# Patient Record
Sex: FEMALE | Race: AMERICAN INDIAN OR ALASKA NATIVE
[De-identification: names, ages, dates, MRNs, and addresses within clinical notes are randomized per-mention and may not be internally consistent; named-entity substitution may affect disease eponyms.]

---
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
## 2019-04-23 NOTE — PROGRESS NOTE
Assessment and Plan





- Patient Problems


(1) Hypertension


Onset Date: ~10/03/18   Current Visit: Yes   Status: Acute   


Qualifiers: 


   Hypertension type: essential hypertension   Qualified Code(s): I10 - 

Essential (primary) hypertension   


Plan to address problem: 


Pt in good spirits -120/80-70 Denies HA, blurred vision, chest pain. Will 

complete the 24hr urine @ 1230 @nd BMZ @ 1700 Continue POC 








Subjective





- Subjective


Date of service: 10/03/18 (c/o some ctx)


Principal diagnosis: IUP @ 36w4d; 24hr urine complete @ 1230


Patient reports: fetal movement normal, contractions





Objective





- Vital Signs


Vital Signs: 


 Vital Signs - 12hr











  10/03/18 10/03/18





  03:27 08:00


 


Temperature 97.9 F 98.9 F


 


Pulse Rate 79 


 


Respiratory 16 





Rate  


 


Blood Pressure 127/72 





[Left]  














- Exam


Breasts: deferred


Cardiovascular: Regular rate


Lungs: Normal air movement


Abdomen: Present: normal appearance, soft.  Absent: distention, tenderness


Uterus: Present: normal


FHR: auscultation normal, category 1


Uterine Contraction Monitor Mode: External


Cervical Dilatation: 1


Cervical Effacement Percentage: 50


Fetal station: -3


Uterine Contraction Pattern: Irregular


Uterine Tone Measurement Phase: Resting


Uterine Contraction Intensity: Mild


Extremities: normal


Deep Tendon Reflex Grade: Normal +2





- Labs


Labs: 


 Abnormal Labs











  10/02/18 10/02/18





  10:59 10:59


 


MCV  74 L 


 


MCH  23 L 


 


RDW  23.4 H 


 


Seg Neutrophils %  71.3 H 


 


Creatinine   0.5 L


 


Lactate Dehydrogenase   264 H








 Laboratory Results - last 24 hr











  10/02/18 10/02/18 10/02/18





  10:59 10:59 10:59


 


WBC  9.5  


 


RBC  4.49  


 


Hgb  10.4  


 


Hct  33.2  


 


MCV  74 L  


 


MCH  23 L  


 


MCHC  31  


 


RDW  23.4 H  


 


Plt Count  413  


 


Lymph % (Auto)  19.1  


 


Mono % (Auto)  6.3  


 


Eos % (Auto)  2.0  


 


Baso % (Auto)  1.3  


 


Lymph #  1.8  


 


Mono #  0.6  


 


Eos #  0.2  


 


Baso #  0.1  


 


Seg Neutrophils %  71.3 H  


 


Seg Neutrophils #  6.8  


 


Creatinine    0.5 L


 


Estimated GFR    > 60


 


Uric Acid    5.9


 


AST    20


 


ALT    16


 


Lactate Dehydrogenase    264 H


 


Urine Color   


 


Urine Turbidity   


 


Urine pH   


 


Ur Specific Gravity   


 


Urine Protein   


 


Urine Glucose (UA)   


 


Urine Ketones   


 


Urine Blood   


 


Urine Nitrite   


 


Urine Bilirubin   


 


Urine Urobilinogen   


 


Ur Leukocyte Esterase   


 


Urine WBC (Auto)   


 


Urine RBC (Auto)   


 


U Epithel Cells (Auto)   


 


Urine Bacteria (Auto)   


 


Urine Mucus   


 


Blood Type   A POSITIVE 


 


Antibody Screen   Negative 














  10/02/18





  12:20


 


WBC 


 


RBC 


 


Hgb 


 


Hct 


 


MCV 


 


MCH 


 


MCHC 


 


RDW 


 


Plt Count 


 


Lymph % (Auto) 


 


Mono % (Auto) 


 


Eos % (Auto) 


 


Baso % (Auto) 


 


Lymph # 


 


Mono # 


 


Eos # 


 


Baso # 


 


Seg Neutrophils % 


 


Seg Neutrophils # 


 


Creatinine 


 


Estimated GFR 


 


Uric Acid 


 


AST 


 


ALT 


 


Lactate Dehydrogenase 


 


Urine Color  Yellow


 


Urine Turbidity  Slightly-cloudy


 


Urine pH  5.0


 


Ur Specific Gravity  1.020


 


Urine Protein  <15 mg/dl


 


Urine Glucose (UA)  Neg


 


Urine Ketones  Neg


 


Urine Blood  Neg


 


Urine Nitrite  Neg


 


Urine Bilirubin  Neg


 


Urine Urobilinogen  < 2.0


 


Ur Leukocyte Esterase  Neg


 


Urine WBC (Auto)  < 1.0


 


Urine RBC (Auto)  1.0


 


U Epithel Cells (Auto)  3.0


 


Urine Bacteria (Auto)  1+


 


Urine Mucus  2+


 


Blood Type 


 


Antibody Screen
n/a

## 2019-11-15 ENCOUNTER — HOSPITAL ENCOUNTER (INPATIENT)
Dept: HOSPITAL 5 - TRG | Age: 19
LOS: 4 days | Discharge: HOME | End: 2019-11-19
Attending: OBSTETRICS & GYNECOLOGY | Admitting: OBSTETRICS & GYNECOLOGY
Payer: MEDICAID

## 2019-11-15 DIAGNOSIS — Z3A.38: ICD-10-CM

## 2019-11-15 DIAGNOSIS — Z23: ICD-10-CM

## 2019-11-15 DIAGNOSIS — F32.9: ICD-10-CM

## 2019-11-15 DIAGNOSIS — B00.89: ICD-10-CM

## 2019-11-15 LAB
ALT SERPL-CCNC: 21 UNITS/L (ref 7–56)
BILIRUB UR QL STRIP: (no result)
BLOOD UR QL VISUAL: (no result)
HCT VFR BLD CALC: 33 % (ref 30.3–42.9)
HGB BLD-MCNC: 10.4 GM/DL (ref 10.1–14.3)
MCHC RBC AUTO-ENTMCNC: 32 % (ref 30–34)
MCV RBC AUTO: 78 FL (ref 79–97)
MUCOUS THREADS #/AREA URNS HPF: (no result) /HPF
PH UR STRIP: 5 [PH] (ref 5–7)
PLATELET # BLD: 347 K/MM3 (ref 140–440)
RBC # BLD AUTO: 4.25 M/MM3 (ref 3.65–5.03)
RBC #/AREA URNS HPF: 3 /HPF (ref 0–6)
URATE SERPL-MCNC: 5.9 MG/DL (ref 3.5–7.6)
UROBILINOGEN UR-MCNC: < 2 MG/DL (ref ?–2)
WBC #/AREA URNS HPF: 1 /HPF (ref 0–6)

## 2019-11-15 PROCEDURE — 84460 ALANINE AMINO (ALT) (SGPT): CPT

## 2019-11-15 PROCEDURE — 86592 SYPHILIS TEST NON-TREP QUAL: CPT

## 2019-11-15 PROCEDURE — 86901 BLOOD TYPING SEROLOGIC RH(D): CPT

## 2019-11-15 PROCEDURE — 82565 ASSAY OF CREATININE: CPT

## 2019-11-15 PROCEDURE — 83735 ASSAY OF MAGNESIUM: CPT

## 2019-11-15 PROCEDURE — 36415 COLL VENOUS BLD VENIPUNCTURE: CPT

## 2019-11-15 PROCEDURE — 85025 COMPLETE CBC W/AUTO DIFF WBC: CPT

## 2019-11-15 PROCEDURE — 85027 COMPLETE CBC AUTOMATED: CPT

## 2019-11-15 PROCEDURE — 85014 HEMATOCRIT: CPT

## 2019-11-15 PROCEDURE — 86900 BLOOD TYPING SEROLOGIC ABO: CPT

## 2019-11-15 PROCEDURE — 83615 LACTATE (LD) (LDH) ENZYME: CPT

## 2019-11-15 PROCEDURE — 84450 TRANSFERASE (AST) (SGOT): CPT

## 2019-11-15 PROCEDURE — 87902 NFCT AGT GNTYP ALYS HEP C: CPT

## 2019-11-15 PROCEDURE — 86850 RBC ANTIBODY SCREEN: CPT

## 2019-11-15 PROCEDURE — 80307 DRUG TEST PRSMV CHEM ANLYZR: CPT

## 2019-11-15 PROCEDURE — 3E0P7VZ INTRODUCTION OF HORMONE INTO FEMALE REPRODUCTIVE, VIA NATURAL OR ARTIFICIAL OPENING: ICD-10-PCS | Performed by: OBSTETRICS & GYNECOLOGY

## 2019-11-15 PROCEDURE — 85018 HEMOGLOBIN: CPT

## 2019-11-15 PROCEDURE — 81001 URINALYSIS AUTO W/SCOPE: CPT

## 2019-11-15 PROCEDURE — 84550 ASSAY OF BLOOD/URIC ACID: CPT

## 2019-11-15 PROCEDURE — 71045 X-RAY EXAM CHEST 1 VIEW: CPT

## 2019-11-15 PROCEDURE — C1765 ADHESION BARRIER: HCPCS

## 2019-11-15 NOTE — PROGRESS NOTE
Assessment and Plan





- Patient Problems


(1) 38 weeks gestation of pregnancy


Current Visit: Yes   Status: Acute   





(2) Gestational hypertension


Current Visit: Yes   Status: Acute   


Qualifiers: 


   Trimester: third trimester   Qualified Code(s): O13.3 - Gestational 

[pregnancy-induced] hypertension without significant proteinuria, third 

trimester   





(3) HSV-2 (herpes simplex virus 2) infection


Current Visit: Yes   Status: Acute   





(4) BMI 40.0-44.9, adult


Current Visit: Yes   Status: Acute   





(5) History of depression


Current Visit: Yes   Status: Acute   





Subjective





- Subjective


Date of service: 11/15/19


Interval history: 





Patient was noted to have elevated BP's in office and was instrd to go to [facility] 

for evaluation, she presented several hours later complaining of HA with 

slightly elevated BP's compared to those noted earlier in the pregnancy. With 

her history of preeclampsia ~1year ago and now increased BP's and HA will admit 

for induction. 





Past Pregnancy History 


   :      2


 Delivery Date: 10/07/2018


  Gestational Age: 37 weeks


  Anesthesia:  epidural


  Delivery Type: Vaginal


  Birth Weight: 6.75 lbs


  Gender: male


  Location: [facility]


  Complications: pre-eclampsia/eclampsia w/Chtn





APGAR - 1 minute: 8


  5 minutes: 9








Past Medical History:


   Reviewed history from 2018 and no changes required:


      asthma - Dr. peterson 


      Asthma





Past Surgical History:


   Reviewed history from 2018 and no changes required:


      negative





Past Medical History 


Anesthesia Complications: negative


Anemia: negative


Autoimmune Disorder: negative


Bleeding Disorder: negative


Blood Transfusions: negative


Breast Disease: negative


Diabetes: negative


Heart Disease: negative


Hypertension: positive, HTN of pregnancy


Hepatitis/Liver Disease: negative


Kidney Disease/UTI: negative


Neurologic/Epilepsy/Migraines: negative


Phlebitis/Varicosities: negative


Psychiatric: negative


Pulmonary Disease/Asthma: negative


Thyroid Disease: negative


Hospitalizations: negative


Surgery (Non-gyn): negative


Abnormal PAP: negative


SPENCER Exposure: negative


Infertility: negative


Uterine Anomaly: negative


Uterine Surgery (not C/S): negative


Other Gynecologic Problems: negative





Social Hx: single


no ETOH/drugs/smoking





Infection History 


Hx of STD: none


HIV Risk Eval: low risk


Hepatitis B Risk Eval: low risk


Personal hx. of genital herpes: no


Partner hx. of genital herpes: no


Rash, Viral, or Febrile illness since last LMP? no


Varicella/Chicken Pox Status: Unknown


TB Risk: no


Infection History Comments: Has not had Chicken pox





Genetic History 


 Congenital Heart Defect:


    Mom: no  Dad: no


Canavan Disease:


    Mom: no  Dad: no


Thalassemia


    Mom: no  Dad: no


Neural Tube Defect


    Mom: no  Dad: no


Down's Syndrome


    Mom: no  Dad: no


William-Sachs


    Mom: no  Dad: no


Sickle Cell Disease/Trait


    Mom: no  Dad: no


Hemophilia


    Mom: no  Dad: no


Muscular Dystrophy


    Mom: no  Dad: no


Cystic Fibrosis


    Mom: no  Dad: no


Crawford Chorea


    Mom: no  Dad: no


Mental Retardation


    Mom: no  Dad: no


Fragile X


    Mom: no  Dad: no


Other Genetic/Chromosomal Disorder


    Mom: no  Dad: no


Child w/other birth defect


    Mom: no  Dad: no





Enviromental Exposures 


Xray Exposure: no


Medication, drug, or alcohol use since LMP: no


Chemical/Other Exposure: no


Exposure to Cat Liter: no


Hx of Parvovirus (Fifth Disease): no


Active Medications (reviewed today):


VALACYCLOVIR HCL 1 GM ORAL TABLET (VALACYCLOVIR HCL) 1 tab po qd


PNV () 


VENTOLIN () 


PROAIR () 


ZOLOFT 50 MG ORAL TABLET (SERTRALINE HCL) 1 po in the morning





Current Allergies (reviewed today):


No known allergies





Patient: YOLANDA SANTOS


ID: [id number]


Note: All result statuses are Final unless otherwise noted.





Tests: (1) RPR, Rfx Qn RPR/Confirm TP ([id number])


  RPR                       Non Reactive                Non Reactive     *1





Tests: (2) Panel 408497 ([id number])


 HIV Screen 4th Generation wRfx


                            Non Reactive                Non Reactive     *2


    


Performed At:  [facility], [address] [id number]


Peter Smith MD     Phone:  [phone]





Note: An exclamation gunjan (!) indicates a result that was not dispersed into the

flowsheet.


Document Creation Date: 2019 12:13 PM





Tests: (1) Ct, Ng, Trich vag by TUCKER ([id number])


  Order Note: Clinical Information: SRC:VR                     SRC:UR


! Chlamydia by TUCKER          Negative                    Negative         *1


! Gonococcus by TUCKER         Negative                    Negative         *2


! Trich vag by TUCKER          Negative                    Negative         *3





Tests: (2) Strep Gp B TUCKER ([id number])


! Strep Gp B TUCKER            Negative                    Negative         *4


    Centers for Disease Control and Prevention (CDC) and American Congress


of Obstetricians and Gynecologists (ACOG) guidelines for prevention of


 group B streptococcal (GBS) disease specify co-collection of


a vaginal and rectal swab specimen to maximize sensitivity of GBS


detection. Per the CDC and ACOG, swabbing both the lower vagina and


rectum substantially increases the yield of detection compared with


sampling the vagina alone.


Penicillin G, ampicillin, or cefazolin are indicated for intrapartum


prophylaxis of  GBS colonization. Reflex susceptibility


testing should be performed prior to use of clindamycin only on GBS


isolates from penicillin-allergic women who are considered a high risk


for anaphylaxis. Treatment with vancomycin without additional testing


is warranted if resistance to clindamycin is noted.


   


    


Performed At:  [facility], [facility] [address] [id number]


Peter Smith MD     Phone:  [phone]





Note: An exclamation gunjan (!) indicates a result that was not dispersed into the

flowsheet.


Document Creation Date: 2019 10:12 AM


_______________________________________________________________________





(1) Order result status: Final


Collection or observation date-time: 2019 04:19





Objective





- Vital Signs


Vital Signs: 


                               Vital Signs - 12hr











  11/15/19 11/15/19 11/15/19





  20:18 20:54 21:30


 


Pulse Rate 74 102 H 86


 


Blood Pressure 186/101 144/97 143/79














  11/15/19





  22:19


 


Pulse Rate 93 H


 


Blood Pressure 130/97














- Labs


Labs: 


                                  Abnormal Labs











  11/15/19





  21:52


 


WBC  11.5 H


 


MCV  78 L


 


MCH  25 L


 


RDW  19.8 H








                         Laboratory Results - last 24 hr











  11/15/19





  21:52


 


WBC  11.5 H


 


RBC  4.25


 


Hgb  10.4


 


Hct  33.0


 


MCV  78 L


 


MCH  25 L


 


MCHC  32


 


RDW  19.8 H


 


Plt Count  347

## 2019-11-15 NOTE — HISTORY AND PHYSICAL REPORT
History of Present Illness


Date of examination: 11/15/19


Chief complaint: 





Elevated blood pressures, HA and history of PreEclampsia


History of present illness: 





Patient was noted to have elevated BP's in office and was instrd to go to [facility] 

for evaluation, she presented several hours later complaining of HA with 

slightly elevated BP's compared to those noted earlier in the pregnancy. With 

her history of preeclampsia ~1year ago and now increased BP's and HA will admit 

for induction. 





Past Pregnancy History 


   :      2


 Delivery Date: 10/07/2018


  Gestational Age: 37 weeks


  Anesthesia:  epidural


  Delivery Type: Vaginal


  Birth Weight: 6.75 lbs


  Gender: male


  Location: [facility]


  Complications: pre-eclampsia/eclampsia w/Chtn





APGAR - 1 minute: 8


  5 minutes: 9








Past Medical History:


   Reviewed history from 2018 and no changes required:


      asthma - Dr. peterson 


      Asthma





Past Surgical History:


   Reviewed history from 2018 and no changes required:


      negative





Past Medical History 


Anesthesia Complications: negative


Anemia: negative


Autoimmune Disorder: negative


Bleeding Disorder: negative


Blood Transfusions: negative


Breast Disease: negative


Diabetes: negative


Heart Disease: negative


Hypertension: positive, HTN of pregnancy


Hepatitis/Liver Disease: negative


Kidney Disease/UTI: negative


Neurologic/Epilepsy/Migraines: negative


Phlebitis/Varicosities: negative


Psychiatric: negative


Pulmonary Disease/Asthma: negative


Thyroid Disease: negative


Hospitalizations: negative


Surgery (Non-gyn): negative


Abnormal PAP: negative


SPENCER Exposure: negative


Infertility: negative


Uterine Anomaly: negative


Uterine Surgery (not C/S): negative


Other Gynecologic Problems: negative





Social Hx: single


no ETOH/drugs/smoking





Infection History 


Hx of STD: none


HIV Risk Eval: low risk


Hepatitis B Risk Eval: low risk


Personal hx. of genital herpes: no


Partner hx. of genital herpes: no


Rash, Viral, or Febrile illness since last LMP? no


Varicella/Chicken Pox Status: Unknown


TB Risk: no


Infection History Comments: Has not had Chicken pox





Genetic History 


 Congenital Heart Defect:


    Mom: no  Dad: no


Canavan Disease:


    Mom: no  Dad: no


Thalassemia


    Mom: no  Dad: no


Neural Tube Defect


    Mom: no  Dad: no


Down's Syndrome


    Mom: no  Dad: no


William-Sachs


    Mom: no  Dad: no


Sickle Cell Disease/Trait


    Mom: no  Dad: no


Hemophilia


    Mom: no  Dad: no


Muscular Dystrophy


    Mom: no  Dad: no


Cystic Fibrosis


    Mom: no  Dad: no


Meliza Chorea


    Mom: no  Dad: no


Mental Retardation


    Mom: no  Dad: no


Fragile X


    Mom: no  Dad: no


Other Genetic/Chromosomal Disorder


    Mom: no  Dad: no


Child w/other birth defect


    Mom: no  Dad: no





Enviromental Exposures 


Xray Exposure: no


Medication, drug, or alcohol use since LMP: no


Chemical/Other Exposure: no


Exposure to Cat Liter: no


Hx of Parvovirus (Fifth Disease): no


Active Medications (reviewed today):


VALACYCLOVIR HCL 1 GM ORAL TABLET (VALACYCLOVIR HCL) 1 tab po qd


PNV () 


VENTOLIN () 


PROAIR () 


ZOLOFT 50 MG ORAL TABLET (SERTRALINE HCL) 1 po in the morning





Current Allergies (reviewed today):


No known allergies





Past History





- Obstetrical History


Expected Date of Delivery: 19


Actual Gestation: 38 Week(s) 0 Day(s) 


: 2


Para: 1


Hx # Term Pregnancies: 1 (preeclampsia)





Medications and Allergies


                                    Allergies











Allergy/AdvReac Type Severity Reaction Status Date / Time


 


No Known Allergies Allergy   Unverified 10/02/18 10:48











                                Home Medications











 Medication  Instructions  Recorded  Confirmed  Last Taken  Type


 


Aspirin [Aspirin BABY CHEW TAB] 81 mg PO QDAY 10/02/18 10/07/18 1 Day Ago 

History





    ~10/06/18 


 


Ferrous Sulfate [Feosol 325 MG tab] 1 tab PO DAILY 10/02/18 10/07/18 2 Days Ago 

History





    ~18 


 


Ibuprofen [Motrin 800 MG tab] 800 mg PO TID PRN #30 tablet 10/09/18  Unknown Rx


 


Lidocain2.5%/Prilocai2.5% [Emla] 5 gm TP PRN #1 tube 10/09/18  Unknown Rx











Active Meds: 


Active Medications





Dinoprostone (Cervidil)  10 mg VG ONCE ONE


   Stop: 11/15/19 21:41


Ephedrine Sulfate (Ephedrine Sulfate)  10 mg IV Q2M PRN


   PRN Reason: Hypotension


Fentanyl (Sublimaze)  100 mcg IV Q2H PRN


   PRN Reason: Labor Pain


Oxytocin/Sodium Chloride (Pitocin/Ns 20 Unit/1000ml Drip)  20 units in 1,000 mls

 @ 125 mls/hr IV AS DIRECT VIRGEN


Oxytocin/Sodium Chloride (Pitocin/Ns 30 Unit/500ml)  30 units in 500 mls @ 1 

mls/hr IV TITR VIRGEN; Protocol


Lactated Ringer's (Lactated Ringers)  1,000 mls @ 125 mls/hr IV AS DIRECT VIRGEN


Lidocaine (Xylocaine 2%)  20 ml INFILTRATI ONCE ONE


   Stop: 11/15/19 21:41


Mineral Oil (Mineral Oil)  30 ml PO QHS PRN


   PRN Reason: Constipation


Naloxone HCl (Naloxone)  0.1 mg IV Q2MIN PRN


   PRN Reason: Res Rate </= 8 or 02 SAT < 92%


Ondansetron HCl (Zofran)  4 mg IV Q8H PRN


   PRN Reason: Nausea And Vomiting


Terbutaline Sulfate (Brethine)  0.25 mg SUB-Q ONCE PRN


   PRN Reason: Hyperstimulation/Hypertonicity


Terbutaline Sulfate (Brethine)  0.25 mg IVP ONCE PRN


   PRN Reason: Hyperstimulation/Hypertonicity











Review of Systems


All systems: negative


Genitourinary: pelvic pain


Neurological: headaches





- Vital Signs


Vital signs: 


                                   Vital Signs











Pulse BP


 


 74   186/101 


 


 11/15/19 20:18  11/15/19 20:18








                                        











Temp Pulse Resp BP Pulse Ox


 


    86      143/79    


 


    11/15/19 21:30     11/15/19 21:30   














- Physical Exam


Breasts: Positive: deferred


Cardiovascular: Regular rate


Lungs: Positive: Normal air movement


Abdomen: Positive: soft.  Negative: distention, tenderness


Genitourinary (Female): Positive: normal external genitalia, normal perenium


Vulva: both: normal


Uterus: Positive: enlarged.  Negative: tender


Anus/Rectum: Positive: normal perianal skin


Extremities: Positive: normal.  Negative: tenderness, edema





- Obstetrical


FHR: category 1


Uterine Contraction Monitor Mode: External


Cervical Dilatation: 2


Cervical Effacement Percentage: 30


Fetal station: -2


Uterine Contraction Pattern: Irregular (mild)


Uterine Contraction Intensity: Mild





Results


All other labs normal.








Assessment and Plan





- Patient Problems


(1) 38 weeks gestation of pregnancy


Current Visit: Yes   Status: Acute   





(2) Gestational hypertension


Current Visit: Yes   Status: Acute   


Qualifiers: 


   Trimester: third trimester   Qualified Code(s): O13.3 - Gestational 

[pregnancy-induced] hypertension without significant proteinuria, third 

trimester   


Plan to address problem: 


will admit and induce


Plan of care explailned, questions encouraged and answered, she voiced 

understanding and agrees with plan of care








(3) HSV-2 (herpes simplex virus 2) infection


Current Visit: Yes   Status: Acute   


Plan to address problem: 


She denies recent outbreaks or prodromal symptoms








(4) BMI 40.0-44.9, adult


Current Visit: Yes   Status: Acute   





(5) History of depression


Current Visit: Yes   Status: Acute

## 2019-11-15 NOTE — EVENT NOTE
Date: 11/15/19





Labs reviewed, elevate AST noted, with elevated BP and LFT's and HA now with 

preeclampsia, will start MgSO4 with induction

## 2019-11-16 LAB
BENZODIAZEPINES SCREEN,URINE: (no result)
METHADONE SCREEN,URINE: (no result)
OPIATE SCREEN,URINE: (no result)

## 2019-11-16 RX ADMIN — MAGNESIUM SULFATE HEPTAHYDRATE SCH MLS/HR: 40 INJECTION, SOLUTION INTRAVENOUS at 20:59

## 2019-11-16 RX ADMIN — MAGNESIUM SULFATE HEPTAHYDRATE SCH MLS/HR: 40 INJECTION, SOLUTION INTRAVENOUS at 04:00

## 2019-11-16 RX ADMIN — HYDRALAZINE HYDROCHLORIDE PRN MG: 20 INJECTION INTRAMUSCULAR; INTRAVENOUS at 00:09

## 2019-11-16 RX ADMIN — SODIUM CHLORIDE, SODIUM LACTATE, POTASSIUM CHLORIDE, AND CALCIUM CHLORIDE SCH MLS/HR: .6; .31; .03; .02 INJECTION, SOLUTION INTRAVENOUS at 00:23

## 2019-11-16 RX ADMIN — HYDRALAZINE HYDROCHLORIDE PRN MG: 20 INJECTION INTRAMUSCULAR; INTRAVENOUS at 08:21

## 2019-11-16 RX ADMIN — SODIUM CHLORIDE, SODIUM LACTATE, POTASSIUM CHLORIDE, AND CALCIUM CHLORIDE SCH MLS/HR: .6; .31; .03; .02 INJECTION, SOLUTION INTRAVENOUS at 14:21

## 2019-11-16 NOTE — ANESTHESIA CONSULTATION
Anesthesia Consult and Med Hx


Date of service: 11/16/19





- Airway


Anesthetic Teeth Evaluation: Good


ROM Head & Neck: Adequate


Mental/Hyoid Distance: Adequate


Mallampati Class: Class II


Intubation Access Assessment: Good





- Pulmonary Exam


CTA: Yes





- Cardiac Exam


Cardiac Exam: RRR





- Pre-Operative Health Status


ASA Pre-Surgery Classification: ASA2


Proposed Anesthetic Plan: Epidural





- Pulmonary


Hx Asthma: Yes (2017)


COPD: No


Hx Pneumonia: No





- Cardiovascular System


Hx Hypertension: Yes (PIH)





- Central Nervous System


Hx Seizures: No


Hx Psychiatric Problems: No





- Endocrine


Hx Renal Disease: No


Hx End Stage Renal Disease: No


Hx Hypothyroidism: No


Hx Hyperthyroidism: No





- Hematic


Hx Anemia: No


Hx Sickle Cell Disease: No





- Other Systems


Hx Alcohol Use: No


Hx Obesity: Yes

## 2019-11-16 NOTE — OPERATIVE REPORT
PREOPERATIVE DIAGNOSES:

1.  Intrauterine pregnancy at 39 weeks.

2.  Preeclampsia.

3.  Breech presentation.



POSTOPERATIVE DIAGNOSES:

1.  Intrauterine pregnancy at 39 weeks.

2.  Preeclampsia.

3.  Breech presentation.



PROCEDURES:

1.  Primary low transverse  section.

2.  Complete marvin breech presentation.



SURGEON:  Rosetta Nance MD



ASSISTANT:  LITTLE Scott CST.



ANESTHESIA:  Epidural.



ANESTHESIOLOGIST:  Dr. Apodaca.



ESTIMATED BLOOD LOOS:  400 mL.



FINDINGS:  Liveborn male infant, weight 6 pounds 7 ounces, delivered from the

marvin breech presentation, Apgars 9 and 9.  Grossly normal uterus, tubes and

ovaries.



DESCRIPTION OF PROCEDURE:  Upon arriving to the room after receiving a call

stating the patient was breech.  Options were reviewed with the patient and her

family with risks, benefits, complications and consequences, the patient voiced

understanding and desired to proceed with .  Consent was reviewed and

signed.  The patient was then moved to the OR and placed in the left lateral

tilt position.  Epidural anesthesia was bolused.  She was prepped and draped in

the usual sterile fashion.  Timeout was performed.  Once the appropriate level

of anesthesia was noted, a Pfannenstiel incision was made and extended to the

fascia, which was incised and extended in the lateral direction.  The overlying

fascia was sharply dissected away from the underlying rectus muscles in a

superior inferior direction.  Midline was entered bluntly.  The vesicouterine

fold was incised with blunt dissection.  Bladder flap was created and a

transverse incision was made in the lower uterine segment and extended in a

superolateral direction with finger fractionation.  Clear fluid was noted.  The

infant was delivered from the marvin breech presentation with spontaneous cry and

excellent tone.  Nuchal cord x 1 was noted.  Cord was doubly clamped and cut. 

Mouth and nose were bulb suctioned.  Infant was given to the 

resuscitation team present.  Placenta was extracted.  The uterus was

exteriorized and cleaned of any further products of conception and placental

tissue.  The uterine incision was reapproximated using 0 Vicryl in a running and

locking stitch followed by further suture of 0 Vicryl in an imbricating fashion

for hemostasis.  Once hemostasis was noted, the uterus was allowed back into the

pelvic cavity.  The pelvis was irrigated with warm normal saline.  Once

hemostasis was noted, Surgicel was placed to ensure further hemostasis. 

Interceed was then placed.  The rectus muscles were reapproximated using 3-0

Vicryl in interrupted figure-of-eight fashion.  Once hemostasis was noted, the

fascia was reapproximated using 0 Vicryl in a simple running stitch.  Once

hemostasis was noted, the skin was reapproximated using 0 Vicryl in a

subcuticular manner on a Fredis needle.  The patient tolerated the procedure

well.  Counts were correct x3.  She was taken to recovery room in stable

condition.





DD: 2019 19:38

DT: 2019 23:08

JOB# [id number]  [id number]

LDR/NTS

## 2019-11-16 NOTE — PROGRESS NOTE
Assessment and Plan


Diagnosis explained


Will d/c cervidil ~noon, chk cervix, ? am care then pitocin, patient aware and 

agrees. 





- Patient Problems


(1) 38 weeks gestation of pregnancy


Current Visit: Yes   Status: Acute   





(2) Pre-eclampsia


Current Visit: Yes   Status: Acute   





(3) HSV-2 (herpes simplex virus 2) infection


Current Visit: Yes   Status: Chronic   





(4) BMI 40.0-44.9, adult


Current Visit: Yes   Status: Chronic   





(5) History of depression


Current Visit: Yes   Status: Chronic   





Subjective





- Subjective


Date of service: 19


Principal diagnosis: IUP@38 weeks, Preeclampsia, HSV


Interval history: 





Patient was noted to have elevated BP's in office and was instrd to go to [facility] 

for evaluation, she presented several hours later complaining of HA with 

slightly elevated BP's compared to those noted earlier in the pregnancy. With 

her history of preeclampsia ~1year ago and now increased BP's and HA will admit 

for induction. 





Past Pregnancy History 


   :      2


 Delivery Date: 10/07/2018


  Gestational Age: 37 weeks


  Anesthesia:  epidural


  Delivery Type: Vaginal


  Birth Weight: 6.75 lbs


  Gender: male


  Location: [facility]


  Complications: pre-eclampsia/eclampsia w/Chtn





APGAR - 1 minute: 8


  5 minutes: 9








Past Medical History:


   Reviewed history from 2018 and no changes required:


      asthma - Dr. peterson 


      Asthma





Past Surgical History:


   Reviewed history from 2018 and no changes required:


      negative





Past Medical History 


Anesthesia Complications: negative


Anemia: negative


Autoimmune Disorder: negative


Bleeding Disorder: negative


Blood Transfusions: negative


Breast Disease: negative


Diabetes: negative


Heart Disease: negative


Hypertension: positive, HTN of pregnancy


Hepatitis/Liver Disease: negative


Kidney Disease/UTI: negative


Neurologic/Epilepsy/Migraines: negative


Phlebitis/Varicosities: negative


Psychiatric: negative


Pulmonary Disease/Asthma: negative


Thyroid Disease: negative


Hospitalizations: negative


Surgery (Non-gyn): negative


Abnormal PAP: negative


SPENCER Exposure: negative


Infertility: negative


Uterine Anomaly: negative


Uterine Surgery (not C/S): negative


Other Gynecologic Problems: negative





Social Hx: single


no ETOH/drugs/smoking





Infection History 


Hx of STD: none


HIV Risk Eval: low risk


Hepatitis B Risk Eval: low risk


Personal hx. of genital herpes: no


Partner hx. of genital herpes: no


Rash, Viral, or Febrile illness since last LMP? no


Varicella/Chicken Pox Status: Unknown


TB Risk: no


Infection History Comments: Has not had Chicken pox





Genetic History 


 Congenital Heart Defect:


    Mom: no  Dad: no


Canavan Disease:


    Mom: no  Dad: no


Thalassemia


    Mom: no  Dad: no


Neural Tube Defect


    Mom: no  Dad: no


Down's Syndrome


    Mom: no  Dad: no


William-Sachs


    Mom: no  Dad: no


Sickle Cell Disease/Trait


    Mom: no  Dad: no


Hemophilia


    Mom: no  Dad: no


Muscular Dystrophy


    Mom: no  Dad: no


Cystic Fibrosis


    Mom: no  Dad: no


Meliza Chorea


    Mom: no  Dad: no


Mental Retardation


    Mom: no  Dad: no


Fragile X


    Mom: no  Dad: no


Other Genetic/Chromosomal Disorder


    Mom: no  Dad: no


Child w/other birth defect


    Mom: no  Dad: no





Enviromental Exposures 


Xray Exposure: no


Medication, drug, or alcohol use since LMP: no


Chemical/Other Exposure: no


Exposure to Cat Liter: no


Hx of Parvovirus (Fifth Disease): no


Active Medications (reviewed today):


VALACYCLOVIR HCL 1 GM ORAL TABLET (VALACYCLOVIR HCL) 1 tab po qd


PNV () 


VENTOLIN () 


PROAIR () 


ZOLOFT 50 MG ORAL TABLET (SERTRALINE HCL) 1 po in the morning





Current Allergies (reviewed today):


No known allergies





Patient: YOLANDA SANTOS


ID: [id number]


Note: All result statuses are Final unless otherwise noted.





Tests: (1) RPR, Rfx Qn RPR/Confirm TP ([id number])


  RPR                       Non Reactive                Non Reactive     *1





Tests: (2) Panel 298720 ([id number])


 HIV Screen 4th Generation wRfx


                            Non Reactive                Non Reactive     *2


    


Performed At:  [facility], [address] [id number]


Peter Smith MD     Phone:  [phone]





Note: An exclamation gunjan (!) indicates a result that was not dispersed into the

flowsheet.


Document Creation Date: 2019 12:13 PM





Tests: (1) Ct, Ng, Trich vag by TUCKER ([id number])


  Order Note: Clinical Information: SRC:VR                     SRC:UR


! Chlamydia by TUCKER          Negative                    Negative         *1


! Gonococcus by TUCKER         Negative                    Negative         *2


! Trich vag by TUCKER          Negative                    Negative         *3





Tests: (2) Strep Gp B TUCKER ([id number])


! Strep Gp B UTCKER            Negative                    Negative         *4


    Centers for Disease Control and Prevention (CDC) and American Congress


of Obstetricians and Gynecologists (ACOG) guidelines for prevention of


 group B streptococcal (GBS) disease specify co-collection of


a vaginal and rectal swab specimen to maximize sensitivity of GBS


detection. Per the CDC and ACOG, swabbing both the lower vagina and


rectum substantially increases the yield of detection compared with


sampling the vagina alone.


Penicillin G, ampicillin, or cefazolin are indicated for intrapartum


prophylaxis of  GBS colonization. Reflex susceptibility


testing should be performed prior to use of clindamycin only on GBS


isolates from penicillin-allergic women who are considered a high risk


for anaphylaxis. Treatment with vancomycin without additional testing


is warranted if resistance to clindamycin is noted.


   


    


Performed At:  [facility], [address] [id number]


Peter Smith MD     Phone:  [phone]





Note: An exclamation gunjan (!) indicates a result that was not dispersed into the

flowsheet.


Document Creation Date: 2019 10:12 AM


_______________________________________________________________________





(1) Order result status: Final


Collection or observation date-time: 2019 04:19


Patient reports: contractions, other (HA resolved now with back and left side 

pain that started with cervidil )





Objective





- Vital Signs


Vital Signs: 


                               Vital Signs - 12hr











  11/15/19 11/15/19 11/15/19





  22:19 22:29 22:34


 


Temperature   


 


Pulse Rate 93 H 85 93 H


 


Respiratory   





Rate   


 


Blood Pressure 130/97  


 


Blood Pressure   





[Right]   


 


O2 Sat by Pulse  97 97





Oximetry   














  11/15/19 11/15/19 11/15/19





  22:39 22:44 22:49


 


Temperature   


 


Pulse Rate 94 H 95 H 89


 


Respiratory   





Rate   


 


Blood Pressure   


 


Blood Pressure   





[Right]   


 


O2 Sat by Pulse 98 97 97





Oximetry   














  11/15/19 11/15/19 11/15/19





  22:54 22:59 23:04


 


Temperature   


 


Pulse Rate 93 H 93 H 82


 


Respiratory   





Rate   


 


Blood Pressure   


 


Blood Pressure   





[Right]   


 


O2 Sat by Pulse 97 97 95





Oximetry   














  11/15/19 11/15/19 11/15/19





  23:09 23:14 23:19


 


Temperature   


 


Pulse Rate 94 H 85 85


 


Respiratory   





Rate   


 


Blood Pressure   


 


Blood Pressure   





[Right]   


 


O2 Sat by Pulse 98 98 97





Oximetry   














  11/15/19 11/15/19 11/15/19





  23:24 23:49 23:51


 


Temperature  97.9 F 


 


Pulse Rate 87 91 H 82


 


Respiratory  16 





Rate   


 


Blood Pressure   156/102


 


Blood Pressure  156/102 





[Right]   


 


O2 Sat by Pulse 98 97 98





Oximetry   














  11/15/19 11/16/19 11/16/19





  23:58 00:03 00:08


 


Temperature   


 


Pulse Rate 86 99 H 86


 


Respiratory   





Rate   


 


Blood Pressure   


 


Blood Pressure   





[Right]   


 


O2 Sat by Pulse 96 96 96





Oximetry   














  19





  00:09 00:13 00:23


 


Temperature   


 


Pulse Rate 77 86 94 H


 


Respiratory   





Rate   


 


Blood Pressure 160/102  143/97


 


Blood Pressure   





[Right]   


 


O2 Sat by Pulse 93 97 





Oximetry   














  19





  00:29 00:33 00:34


 


Temperature   


 


Pulse Rate 165 H 112 H 108 H


 


Respiratory  30 H 





Rate   


 


Blood Pressure  150/109 


 


Blood Pressure   





[Right]   


 


O2 Sat by Pulse 99  99





Oximetry   














  19





  00:37 00:39 00:44


 


Temperature   


 


Pulse Rate 104 H 98 H 115 H


 


Respiratory   





Rate   


 


Blood Pressure 150/102  


 


Blood Pressure   





[Right]   


 


O2 Sat by Pulse  100 98





Oximetry   














  19





  00:49 00:52 00:54


 


Temperature   


 


Pulse Rate 119 H 114 H 112 H


 


Respiratory  20 





Rate   


 


Blood Pressure  147/81 


 


Blood Pressure   





[Right]   


 


O2 Sat by Pulse 97 91 96





Oximetry   














  19





  00:59 01:04 01:07


 


Temperature   


 


Pulse Rate 113 H 127 H 110 H


 


Respiratory   





Rate   


 


Blood Pressure   158/89


 


Blood Pressure   





[Right]   


 


O2 Sat by Pulse 95 96 





Oximetry   














  19





  01:09 01:14 01:19


 


Temperature   


 


Pulse Rate 118 H 115 H 111 H


 


Respiratory   





Rate   


 


Blood Pressure   


 


Blood Pressure   





[Right]   


 


O2 Sat by Pulse 94 96 97





Oximetry   














  19





  01:23 01:24 01:29


 


Temperature   


 


Pulse Rate 122 H 106 H 119 H


 


Respiratory   





Rate   


 


Blood Pressure  142/65 


 


Blood Pressure   





[Right]   


 


O2 Sat by Pulse 89 96 96





Oximetry   














  19





  01:40 01:47 01:48


 


Temperature   


 


Pulse Rate 116 H 117 H 115 H


 


Respiratory   





Rate   


 


Blood Pressure   146/86


 


Blood Pressure   





[Right]   


 


O2 Sat by Pulse 98 96 





Oximetry   














  19





  01:52 01:54 01:57


 


Temperature   


 


Pulse Rate 121 H 114 H 105 H


 


Respiratory   





Rate   


 


Blood Pressure  157/83 


 


Blood Pressure   





[Right]   


 


O2 Sat by Pulse 96  96





Oximetry   














  19





  02:02 02:07 02:12


 


Temperature   


 


Pulse Rate 110 H 120 H 120 H


 


Respiratory   





Rate   


 


Blood Pressure  144/60 


 


Blood Pressure   





[Right]   


 


O2 Sat by Pulse 96 96 97





Oximetry   














  19





  02:17 02:22 02:23


 


Temperature   


 


Pulse Rate 102 H 104 H 101 H


 


Respiratory   





Rate   


 


Blood Pressure   178/75


 


Blood Pressure   





[Right]   


 


O2 Sat by Pulse 97 99 





Oximetry   














  19





  02:27 02:32 02:37


 


Temperature   


 


Pulse Rate 99 H 106 H 108 H


 


Respiratory   





Rate   


 


Blood Pressure   


 


Blood Pressure   





[Right]   


 


O2 Sat by Pulse 97 97 96





Oximetry   














  19





  02:38 02:42 02:47


 


Temperature   


 


Pulse Rate 105 H 108 H 106 H


 


Respiratory   





Rate   


 


Blood Pressure 152/80  


 


Blood Pressure   





[Right]   


 


O2 Sat by Pulse  97 96





Oximetry   














  19





  02:52 02:54 02:57


 


Temperature   


 


Pulse Rate 100 H 97 H 106 H


 


Respiratory   





Rate   


 


Blood Pressure  135/66 


 


Blood Pressure   





[Right]   


 


O2 Sat by Pulse 96  96





Oximetry   














  19





  03:02 03:07 03:10


 


Temperature   


 


Pulse Rate 103 H 96 H 97 H


 


Respiratory   





Rate   


 


Blood Pressure   135/63


 


Blood Pressure   





[Right]   


 


O2 Sat by Pulse 96 98 





Oximetry   














  19





  03:12 03:17 03:22


 


Temperature   


 


Pulse Rate 100 H 93 H 101 H


 


Respiratory   





Rate   


 


Blood Pressure   


 


Blood Pressure   





[Right]   


 


O2 Sat by Pulse 96 96 97





Oximetry   














  19





  03:27 03:32 03:37


 


Temperature   


 


Pulse Rate 99 H 95 H 89


 


Respiratory   





Rate   


 


Blood Pressure   


 


Blood Pressure   





[Right]   


 


O2 Sat by Pulse 97 97 98





Oximetry   














  19





  03:42 03:47 03:52


 


Temperature   


 


Pulse Rate 94 H 88 83


 


Respiratory   





Rate   


 


Blood Pressure   129/58


 


Blood Pressure   





[Right]   


 


O2 Sat by Pulse 97 97 96





Oximetry   














  19





  03:57 04:02 04:07


 


Temperature   


 


Pulse Rate 80 83 89


 


Respiratory   





Rate   


 


Blood Pressure   109/57


 


Blood Pressure   





[Right]   


 


O2 Sat by Pulse 97 97 97





Oximetry   














  19





  04:15 04:20 04:22


 


Temperature   


 


Pulse Rate 89 81 88


 


Respiratory   





Rate   


 


Blood Pressure   128/62


 


Blood Pressure   





[Right]   


 


O2 Sat by Pulse 99 99 





Oximetry   














  19





  04:25 04:30 04:35


 


Temperature   


 


Pulse Rate 80 86 84


 


Respiratory   





Rate   


 


Blood Pressure   


 


Blood Pressure   





[Right]   


 


O2 Sat by Pulse 100 99 99





Oximetry   














  19





  04:37 04:40 04:45


 


Temperature   


 


Pulse Rate 80 99 H 82


 


Respiratory   





Rate   


 


Blood Pressure 130/63  


 


Blood Pressure   





[Right]   


 


O2 Sat by Pulse  100 98





Oximetry   














  19





  04:50 04:53 04:55


 


Temperature   


 


Pulse Rate 87 92 H 86


 


Respiratory   





Rate   


 


Blood Pressure  150/82 


 


Blood Pressure   





[Right]   


 


O2 Sat by Pulse 98  98





Oximetry   














  19





  05:00 05:05 05:08


 


Temperature   


 


Pulse Rate 87 89 94 H


 


Respiratory   





Rate   


 


Blood Pressure   145/75


 


Blood Pressure   





[Right]   


 


O2 Sat by Pulse 98 98 





Oximetry   














  19





  05:10 05:15 05:20


 


Temperature   


 


Pulse Rate 82 75 80


 


Respiratory   





Rate   


 


Blood Pressure   


 


Blood Pressure   





[Right]   


 


O2 Sat by Pulse 97 98 98





Oximetry   














  19





  05:23 05:25 05:30


 


Temperature   


 


Pulse Rate 81 86 79


 


Respiratory   





Rate   


 


Blood Pressure 141/63  


 


Blood Pressure   





[Right]   


 


O2 Sat by Pulse  98 98





Oximetry   














  19





  05:35 05:37 05:40


 


Temperature   


 


Pulse Rate 84 84 79


 


Respiratory   





Rate   


 


Blood Pressure  137/69 


 


Blood Pressure   





[Right]   


 


O2 Sat by Pulse 98  97





Oximetry   














  19





  05:45 05:50 05:52


 


Temperature   


 


Pulse Rate 78 81 73


 


Respiratory   





Rate   


 


Blood Pressure   143/67


 


Blood Pressure   





[Right]   


 


O2 Sat by Pulse 97 97 94





Oximetry   














  19





  05:55 06:00 06:05


 


Temperature   


 


Pulse Rate 81 83 97 H


 


Respiratory   





Rate   


 


Blood Pressure   


 


Blood Pressure   





[Right]   


 


O2 Sat by Pulse 96 95 96





Oximetry   














  19





  06:07 06:08 06:10


 


Temperature   


 


Pulse Rate 86 92 H 85


 


Respiratory   





Rate   


 


Blood Pressure  144/72 


 


Blood Pressure   





[Right]   


 


O2 Sat by Pulse 94  96





Oximetry   














  19





  06:15 06:20 06:22


 


Temperature   


 


Pulse Rate 83 83 85


 


Respiratory   





Rate   


 


Blood Pressure   142/63


 


Blood Pressure   





[Right]   


 


O2 Sat by Pulse 96 97 





Oximetry   














  19





  06:25 06:30 06:35


 


Temperature   


 


Pulse Rate 84 80 82


 


Respiratory   





Rate   


 


Blood Pressure   


 


Blood Pressure   





[Right]   


 


O2 Sat by Pulse 96 97 97





Oximetry   














  19





  06:37 06:40 06:45


 


Temperature   


 


Pulse Rate 81 79 83


 


Respiratory   





Rate   


 


Blood Pressure 150/71  


 


Blood Pressure   





[Right]   


 


O2 Sat by Pulse  97 96





Oximetry   














  19





  06:50 06:52 06:55


 


Temperature   


 


Pulse Rate 76 84 77


 


Respiratory   





Rate   


 


Blood Pressure  162/76 


 


Blood Pressure   





[Right]   


 


O2 Sat by Pulse 96  98





Oximetry   














  19





  07:00 07:05 07:10


 


Temperature   


 


Pulse Rate 79 77 82


 


Respiratory   





Rate   


 


Blood Pressure   


 


Blood Pressure   





[Right]   


 


O2 Sat by Pulse 97 98 97





Oximetry   














  19





  07:15 07:20 07:23


 


Temperature   


 


Pulse Rate 78 85 75


 


Respiratory  20 





Rate   


 


Blood Pressure   134/62


 


Blood Pressure   





[Right]   


 


O2 Sat by Pulse 98 96 





Oximetry   














  19





  07:25 07:30 07:35


 


Temperature   


 


Pulse Rate 82 81 83


 


Respiratory   





Rate   


 


Blood Pressure   


 


Blood Pressure   





[Right]   


 


O2 Sat by Pulse 97 97 97





Oximetry   














  19





  07:37 07:40 07:45


 


Temperature   


 


Pulse Rate 81 84 105 H


 


Respiratory   





Rate   


 


Blood Pressure 122/58  


 


Blood Pressure   





[Right]   


 


O2 Sat by Pulse  97 99





Oximetry   














  19





  07:50 07:53 08:01


 


Temperature  97.8 F 


 


Pulse Rate 77  89


 


Respiratory  18 





Rate   


 


Blood Pressure   164/92


 


Blood Pressure  164/92 





[Right]   


 


O2 Sat by Pulse 97  99





Oximetry   














  19





  08:06 08:11 08:16


 


Temperature   


 


Pulse Rate 92 H 117 H 96 H


 


Respiratory   





Rate   


 


Blood Pressure   


 


Blood Pressure   





[Right]   


 


O2 Sat by Pulse 98 98 97





Oximetry   














  19





  08:21 08:26 08:31


 


Temperature   


 


Pulse Rate 103 H 98 H 116 H


 


Respiratory   





Rate   


 


Blood Pressure 164/92  


 


Blood Pressure   





[Right]   


 


O2 Sat by Pulse 97 97 96





Oximetry   














  19





  08:36 08:37 08:41


 


Temperature   


 


Pulse Rate 121 H 114 H 118 H


 


Respiratory   





Rate   


 


Blood Pressure  136/60 


 


Blood Pressure   





[Right]   


 


O2 Sat by Pulse 96  97





Oximetry   














  19





  08:46 08:51 08:56


 


Temperature   


 


Pulse Rate 113 H 116 H 109 H


 


Respiratory   





Rate   


 


Blood Pressure   


 


Blood Pressure   





[Right]   


 


O2 Sat by Pulse 96 97 96





Oximetry   














  19





  09:01 09:06 09:11


 


Temperature   


 


Pulse Rate 108 H 114 H 113 H


 


Respiratory   





Rate   


 


Blood Pressure   


 


Blood Pressure   





[Right]   


 


O2 Sat by Pulse 97 96 98





Oximetry   














  19





  09:16 09:21 09:26


 


Temperature   


 


Pulse Rate 108 H 105 H 112 H


 


Respiratory   





Rate   


 


Blood Pressure   


 


Blood Pressure   





[Right]   


 


O2 Sat by Pulse 97 97 97





Oximetry   














  19





  09:31 09:34 09:36


 


Temperature   


 


Pulse Rate 110 H 117 H 122 H


 


Respiratory   





Rate   


 


Blood Pressure  136/60 


 


Blood Pressure   





[Right]   


 


O2 Sat by Pulse 97  98





Oximetry   














  19





  09:39 09:41 09:46


 


Temperature   


 


Pulse Rate 113 H 115 H 108 H


 


Respiratory   





Rate   


 


Blood Pressure 132/58  


 


Blood Pressure   





[Right]   


 


O2 Sat by Pulse  98 98





Oximetry   














  19





  09:51 09:56 10:01


 


Temperature   


 


Pulse Rate 116 H 105 H 115 H


 


Respiratory   





Rate   


 


Blood Pressure   


 


Blood Pressure   





[Right]   


 


O2 Sat by Pulse 99 99 99





Oximetry   














- Exam


Breasts: deferred


Lungs: Clear to auscultation, Normal air movement


Abdomen: Present: soft


Uterus: Present: fundal height above umbilicus.  Absent: tenderness


FHR: category 1


Uterine Contraction Monitor Mode: External


Uterine Contraction Pattern: Irregular


Extremities: normal





- Labs


Labs: 


                                  Abnormal Labs











  11/15/19 11/15/19 11/15/19





  21:52 21:52 21:52


 


WBC   11.5 H 


 


MCV   78 L 


 


MCH   25 L 


 


RDW   19.8 H 


 


Creatinine    0.6 L


 


Magnesium   


 


AST    44 H


 


Lactate Dehydrogenase    713 H


 


Ur Specific Gravity  1.036 H  














  19





  03:23


 


WBC 


 


MCV 


 


MCH 


 


RDW 


 


Creatinine 


 


Magnesium  2.60 H


 


AST 


 


Lactate Dehydrogenase 


 


Ur Specific Gravity 








                         Laboratory Results - last 24 hr











  11/15/19 11/15/19 11/15/19





  02:44 21:52 21:52


 


WBC   


 


RBC   


 


Hgb   


 


Hct   


 


MCV   


 


MCH   


 


MCHC   


 


RDW   


 


Plt Count   


 


Creatinine   


 


Estimated GFR   


 


Uric Acid   


 


Magnesium   


 


AST   


 


ALT   


 


Lactate Dehydrogenase   


 


Urine Color   Yellow 


 


Urine Turbidity   Clear 


 


Urine pH   5.0 


 


Ur Specific Gravity   1.036 H 


 


Urine Protein   100 mg/dl 


 


Urine Glucose (UA)   Neg 


 


Urine Ketones   Tr 


 


Urine Blood   Neg 


 


Urine Nitrite   Neg 


 


Urine Bilirubin   Neg 


 


Urine Urobilinogen   < 2.0 


 


Ur Leukocyte Esterase   Neg 


 


Urine WBC (Auto)   1.0 


 


Urine RBC (Auto)   3.0 


 


U Epithel Cells (Auto)   1.0 


 


Urine Mucus   3+ 


 


Urine Opiates Screen  Presumptive negative  


 


Urine Methadone Screen  Presumptive negative  


 


Ur Barbiturates Screen  Presumptive negative  


 


Ur Phencyclidine Scrn  Presumptive negative  


 


Ur Amphetamines Screen  Presumptive negative  


 


U Benzodiazepines Scrn  Presumptive negative  


 


Urine Cocaine Screen  Presumptive negative  


 


U Marijuana (THC) Screen  Presumptive negative  


 


Drugs of Abuse Note  Disclamer  


 


Syphilis IgG Antibody   


 


Blood Type    A POSITIVE


 


Antibody Screen    Negative














  11/15/19 11/15/19 11/15/19





  21:52 21:52 21:52


 


WBC  11.5 H  


 


RBC  4.25  


 


Hgb  10.4  


 


Hct  33.0  


 


MCV  78 L  


 


MCH  25 L  


 


MCHC  32  


 


RDW  19.8 H  


 


Plt Count  347  


 


Creatinine   0.6 L 


 


Estimated GFR   > 60 


 


Uric Acid   5.9 


 


Magnesium   


 


AST   44 H 


 


ALT   21 


 


Lactate Dehydrogenase   713 H 


 


Urine Color   


 


Urine Turbidity   


 


Urine pH   


 


Ur Specific Gravity   


 


Urine Protein   


 


Urine Glucose (UA)   


 


Urine Ketones   


 


Urine Blood   


 


Urine Nitrite   


 


Urine Bilirubin   


 


Urine Urobilinogen   


 


Ur Leukocyte Esterase   


 


Urine WBC (Auto)   


 


Urine RBC (Auto)   


 


U Epithel Cells (Auto)   


 


Urine Mucus   


 


Urine Opiates Screen   


 


Urine Methadone Screen   


 


Ur Barbiturates Screen   


 


Ur Phencyclidine Scrn   


 


Ur Amphetamines Screen   


 


U Benzodiazepines Scrn   


 


Urine Cocaine Screen   


 


U Marijuana (THC) Screen   


 


Drugs of Abuse Note   


 


Syphilis IgG Antibody    Non-reactive


 


Blood Type   


 


Antibody Screen   














  19





  03:23


 


WBC 


 


RBC 


 


Hgb 


 


Hct 


 


MCV 


 


MCH 


 


MCHC 


 


RDW 


 


Plt Count 


 


Creatinine 


 


Estimated GFR 


 


Uric Acid 


 


Magnesium  2.60 H


 


AST 


 


ALT 


 


Lactate Dehydrogenase 


 


Urine Color 


 


Urine Turbidity 


 


Urine pH 


 


Ur Specific Gravity 


 


Urine Protein 


 


Urine Glucose (UA) 


 


Urine Ketones 


 


Urine Blood 


 


Urine Nitrite 


 


Urine Bilirubin 


 


Urine Urobilinogen 


 


Ur Leukocyte Esterase 


 


Urine WBC (Auto) 


 


Urine RBC (Auto) 


 


U Epithel Cells (Auto) 


 


Urine Mucus 


 


Urine Opiates Screen 


 


Urine Methadone Screen 


 


Ur Barbiturates Screen 


 


Ur Phencyclidine Scrn 


 


Ur Amphetamines Screen 


 


U Benzodiazepines Scrn 


 


Urine Cocaine Screen 


 


U Marijuana (THC) Screen 


 


Drugs of Abuse Note 


 


Syphilis IgG Antibody 


 


Blood Type 


 


Antibody Screen

## 2019-11-16 NOTE — PROGRESS NOTE
Assessment and Plan





Start pitocin


may have epidural








- Patient Problems


(1) 38 weeks gestation of pregnancy


Current Visit: Yes   Status: Acute   





(2) Pre-eclampsia


Current Visit: Yes   Status: Acute   





(3) HSV-2 (herpes simplex virus 2) infection


Current Visit: Yes   Status: Chronic   





(4) BMI 40.0-44.9, adult


Current Visit: Yes   Status: Chronic   





(5) History of depression


Current Visit: Yes   Status: Chronic   





Subjective





- Subjective


Date of service: 19


Principal diagnosis: IUP@38 weeks, Preeclampsia, HSV


Interval history: 





Patient was noted to have elevated BP's in office and was instrd to go to [facility] 

for evaluation, she presented several hours later complaining of HA with 

slightly elevated BP's compared to those noted earlier in the pregnancy. With 

her history of preeclampsia ~1year ago and now increased BP's and HA will admit 

for induction. 





Past Pregnancy History 


   :      2


 Delivery Date: 10/07/2018


  Gestational Age: 37 weeks


  Anesthesia:  epidural


  Delivery Type: Vaginal


  Birth Weight: 6.75 lbs


  Gender: male


  Location: [facility]


  Complications: pre-eclampsia/eclampsia w/Chtn





APGAR - 1 minute: 8


  5 minutes: 9








Past Medical History:


   Reviewed history from 2018 and no changes required:


      asthma - Dr. peterson 


      Asthma





Past Surgical History:


   Reviewed history from 2018 and no changes required:


      negative





Past Medical History 


Anesthesia Complications: negative


Anemia: negative


Autoimmune Disorder: negative


Bleeding Disorder: negative


Blood Transfusions: negative


Breast Disease: negative


Diabetes: negative


Heart Disease: negative


Hypertension: positive, HTN of pregnancy


Hepatitis/Liver Disease: negative


Kidney Disease/UTI: negative


Neurologic/Epilepsy/Migraines: negative


Phlebitis/Varicosities: negative


Psychiatric: negative


Pulmonary Disease/Asthma: negative


Thyroid Disease: negative


Hospitalizations: negative


Surgery (Non-gyn): negative


Abnormal PAP: negative


SPENCER Exposure: negative


Infertility: negative


Uterine Anomaly: negative


Uterine Surgery (not C/S): negative


Other Gynecologic Problems: negative





Social Hx: single


no ETOH/drugs/smoking





Infection History 


Hx of STD: none


HIV Risk Eval: low risk


Hepatitis B Risk Eval: low risk


Personal hx. of genital herpes: no


Partner hx. of genital herpes: no


Rash, Viral, or Febrile illness since last LMP? no


Varicella/Chicken Pox Status: Unknown


TB Risk: no


Infection History Comments: Has not had Chicken pox





Genetic History 


 Congenital Heart Defect:


    Mom: no  Dad: no


Canavan Disease:


    Mom: no  Dad: no


Thalassemia


    Mom: no  Dad: no


Neural Tube Defect


    Mom: no  Dad: no


Down's Syndrome


    Mom: no  Dad: no


William-Sachs


    Mom: no  Dad: no


Sickle Cell Disease/Trait


    Mom: no  Dad: no


Hemophilia


    Mom: no  Dad: no


Muscular Dystrophy


    Mom: no  Dad: no


Cystic Fibrosis


    Mom: no  Dad: no


Mary Alice Chorea


    Mom: no  Dad: no


Mental Retardation


    Mom: no  Dad: no


Fragile X


    Mom: no  Dad: no


Other Genetic/Chromosomal Disorder


    Mom: no  Dad: no


Child w/other birth defect


    Mom: no  Dad: no





Enviromental Exposures 


Xray Exposure: no


Medication, drug, or alcohol use since LMP: no


Chemical/Other Exposure: no


Exposure to Cat Liter: no


Hx of Parvovirus (Fifth Disease): no


Active Medications (reviewed today):


VALACYCLOVIR HCL 1 GM ORAL TABLET (VALACYCLOVIR HCL) 1 tab po qd


PNV () 


VENTOLIN () 


PROAIR () 


ZOLOFT 50 MG ORAL TABLET (SERTRALINE HCL) 1 po in the morning





Current Allergies (reviewed today):


No known allergies





Patient: YOLANDA SANTOS


ID: [id number]


Note: All result statuses are Final unless otherwise noted.





Tests: (1) RPR, Rfx Qn RPR/Confirm TP ([id number])


  RPR                       Non Reactive                Non Reactive     *1





Tests: (2) Panel 982228 ([id number])


 HIV Screen 4th Generation wRfx


                            Non Reactive                Non Reactive     *2


    


Performed At:  [address] [id number]


Peter Smith MD     Phone:  [phone]





Note: An exclamation gunjan (!) indicates a result that was not dispersed into the

flowsheet.


Document Creation Date: 2019 12:13 PM





Tests: (1) Ct, Ng, Trich vag by TUCKER ([id number])


  Order Note: Clinical Information: SRC:VR                     SRC:UR


! Chlamydia by TUCKER          Negative                    Negative         *1


! Gonococcus by TUCKER         Negative                    Negative         *2


! Trich vag by TUCKER          Negative                    Negative         *3





Tests: (2) Strep Gp B TUCKER ([id number])


! Strep Gp B TUCKER            Negative                    Negative         *4


    Centers for Disease Control and Prevention (CDC) and American Congress


of Obstetricians and Gynecologists (ACOG) guidelines for prevention of


 group B streptococcal (GBS) disease specify co-collection of


a vaginal and rectal swab specimen to maximize sensitivity of GBS


detection. Per the CDC and ACOG, swabbing both the lower vagina and


rectum substantially increases the yield of detection compared with


sampling the vagina alone.


Penicillin G, ampicillin, or cefazolin are indicated for intrapartum


prophylaxis of  GBS colonization. Reflex susceptibility


testing should be performed prior to use of clindamycin only on GBS


isolates from penicillin-allergic women who are considered a high risk


for anaphylaxis. Treatment with vancomycin without additional testing


is warranted if resistance to clindamycin is noted.


   


    


Performed At:  [facility] [id number]


Peter Smith MD     Phone:  [phone]





Note: An exclamation gunjan (!) indicates a result that was not dispersed into the

flowsheet.


Document Creation Date: 2019 10:12 AM


_______________________________________________________________________





(1) Order result status: Final


Collection or observation date-time: 2019 04:19


Patient reports: contractions, other (HA resolved now with back and left side 

pain that started with cervidil )





Objective





- Vital Signs


Vital Signs: 


                               Vital Signs - 12hr











  19





  01:29 01:40 01:47


 


Temperature   


 


Pulse Rate 119 H 116 H 117 H


 


Respiratory   





Rate   


 


Blood Pressure   


 


Blood Pressure   





[Right]   


 


O2 Sat by Pulse 96 98 96





Oximetry   














  19





  01:48 01:52 01:54


 


Temperature   


 


Pulse Rate 115 H 121 H 114 H


 


Respiratory   





Rate   


 


Blood Pressure 146/86  157/83


 


Blood Pressure   





[Right]   


 


O2 Sat by Pulse  96 





Oximetry   














  19





  01:57 02:02 02:07


 


Temperature   


 


Pulse Rate 105 H 110 H 120 H


 


Respiratory   





Rate   


 


Blood Pressure   144/60


 


Blood Pressure   





[Right]   


 


O2 Sat by Pulse 96 96 96





Oximetry   














  19





  02:12 02:17 02:22


 


Temperature   


 


Pulse Rate 120 H 102 H 104 H


 


Respiratory   





Rate   


 


Blood Pressure   


 


Blood Pressure   





[Right]   


 


O2 Sat by Pulse 97 97 99





Oximetry   














  19





  02:23 02:27 02:32


 


Temperature   


 


Pulse Rate 101 H 99 H 106 H


 


Respiratory   





Rate   


 


Blood Pressure 178/75  


 


Blood Pressure   





[Right]   


 


O2 Sat by Pulse  97 97





Oximetry   














  19





  02:37 02:38 02:42


 


Temperature   


 


Pulse Rate 108 H 105 H 108 H


 


Respiratory   





Rate   


 


Blood Pressure  152/80 


 


Blood Pressure   





[Right]   


 


O2 Sat by Pulse 96  97





Oximetry   














  19





  02:47 02:52 02:54


 


Temperature   


 


Pulse Rate 106 H 100 H 97 H


 


Respiratory   





Rate   


 


Blood Pressure   135/66


 


Blood Pressure   





[Right]   


 


O2 Sat by Pulse 96 96 





Oximetry   














  19





  02:57 03:02 03:07


 


Temperature   


 


Pulse Rate 106 H 103 H 96 H


 


Respiratory   





Rate   


 


Blood Pressure   


 


Blood Pressure   





[Right]   


 


O2 Sat by Pulse 96 96 98





Oximetry   














  19





  03:10 03:12 03:17


 


Temperature   


 


Pulse Rate 97 H 100 H 93 H


 


Respiratory   





Rate   


 


Blood Pressure 135/63  


 


Blood Pressure   





[Right]   


 


O2 Sat by Pulse  96 96





Oximetry   














  19





  03:22 03:27 03:32


 


Temperature   


 


Pulse Rate 101 H 99 H 95 H


 


Respiratory   





Rate   


 


Blood Pressure   


 


Blood Pressure   





[Right]   


 


O2 Sat by Pulse 97 97 97





Oximetry   














  19





  03:37 03:42 03:47


 


Temperature   


 


Pulse Rate 89 94 H 88


 


Respiratory   





Rate   


 


Blood Pressure   


 


Blood Pressure   





[Right]   


 


O2 Sat by Pulse 98 97 97





Oximetry   














  19





  03:52 03:57 04:02


 


Temperature   


 


Pulse Rate 83 80 83


 


Respiratory   





Rate   


 


Blood Pressure 129/58  


 


Blood Pressure   





[Right]   


 


O2 Sat by Pulse 96 97 97





Oximetry   














  19





  04:07 04:15 04:20


 


Temperature   


 


Pulse Rate 89 89 81


 


Respiratory   





Rate   


 


Blood Pressure 109/57  


 


Blood Pressure   





[Right]   


 


O2 Sat by Pulse 97 99 99





Oximetry   














  19





  04:22 04:25 04:30


 


Temperature   


 


Pulse Rate 88 80 86


 


Respiratory   





Rate   


 


Blood Pressure 128/62  


 


Blood Pressure   





[Right]   


 


O2 Sat by Pulse  100 99





Oximetry   














  19





  04:35 04:37 04:40


 


Temperature   


 


Pulse Rate 84 80 99 H


 


Respiratory   





Rate   


 


Blood Pressure  130/63 


 


Blood Pressure   





[Right]   


 


O2 Sat by Pulse 99  100





Oximetry   














  19





  04:45 04:50 04:53


 


Temperature   


 


Pulse Rate 82 87 92 H


 


Respiratory   





Rate   


 


Blood Pressure   150/82


 


Blood Pressure   





[Right]   


 


O2 Sat by Pulse 98 98 





Oximetry   














  19





  04:55 05:00 05:05


 


Temperature   


 


Pulse Rate 86 87 89


 


Respiratory   





Rate   


 


Blood Pressure   


 


Blood Pressure   





[Right]   


 


O2 Sat by Pulse 98 98 98





Oximetry   














  19





  05:08 05:10 05:15


 


Temperature   


 


Pulse Rate 94 H 82 75


 


Respiratory   





Rate   


 


Blood Pressure 145/75  


 


Blood Pressure   





[Right]   


 


O2 Sat by Pulse  97 98





Oximetry   














  19





  05:20 05:23 05:25


 


Temperature   


 


Pulse Rate 80 81 86


 


Respiratory   





Rate   


 


Blood Pressure  141/63 


 


Blood Pressure   





[Right]   


 


O2 Sat by Pulse 98  98





Oximetry   














  19





  05:30 05:35 05:37


 


Temperature   


 


Pulse Rate 79 84 84


 


Respiratory   





Rate   


 


Blood Pressure   137/69


 


Blood Pressure   





[Right]   


 


O2 Sat by Pulse 98 98 





Oximetry   














  19





  05:40 05:45 05:50


 


Temperature   


 


Pulse Rate 79 78 81


 


Respiratory   





Rate   


 


Blood Pressure   


 


Blood Pressure   





[Right]   


 


O2 Sat by Pulse 97 97 97





Oximetry   














  19





  05:52 05:55 06:00


 


Temperature   


 


Pulse Rate 73 81 83


 


Respiratory   





Rate   


 


Blood Pressure 143/67  


 


Blood Pressure   





[Right]   


 


O2 Sat by Pulse 94 96 95





Oximetry   














  19





  06:05 06:07 06:08


 


Temperature   


 


Pulse Rate 97 H 86 92 H


 


Respiratory   





Rate   


 


Blood Pressure   144/72


 


Blood Pressure   





[Right]   


 


O2 Sat by Pulse 96 94 





Oximetry   














  19





  06:10 06:15 06:20


 


Temperature   


 


Pulse Rate 85 83 83


 


Respiratory   





Rate   


 


Blood Pressure   


 


Blood Pressure   





[Right]   


 


O2 Sat by Pulse 96 96 97





Oximetry   














  19





  06:22 06:25 06:30


 


Temperature   


 


Pulse Rate 85 84 80


 


Respiratory   





Rate   


 


Blood Pressure 142/63  


 


Blood Pressure   





[Right]   


 


O2 Sat by Pulse  96 97





Oximetry   














  19





  06:35 06:37 06:40


 


Temperature   


 


Pulse Rate 82 81 79


 


Respiratory   





Rate   


 


Blood Pressure  150/71 


 


Blood Pressure   





[Right]   


 


O2 Sat by Pulse 97  97





Oximetry   














  19





  06:45 06:50 06:52


 


Temperature   


 


Pulse Rate 83 76 84


 


Respiratory   





Rate   


 


Blood Pressure   162/76


 


Blood Pressure   





[Right]   


 


O2 Sat by Pulse 96 96 





Oximetry   














  19





  06:55 07:00 07:05


 


Temperature   


 


Pulse Rate 77 79 77


 


Respiratory   





Rate   


 


Blood Pressure   


 


Blood Pressure   





[Right]   


 


O2 Sat by Pulse 98 97 98





Oximetry   














  19





  07:10 07:15 07:20


 


Temperature   


 


Pulse Rate 82 78 85


 


Respiratory   20





Rate   


 


Blood Pressure   


 


Blood Pressure   





[Right]   


 


O2 Sat by Pulse 97 98 96





Oximetry   














  19





  07:23 07:25 07:30


 


Temperature   


 


Pulse Rate 75 82 81


 


Respiratory   





Rate   


 


Blood Pressure 134/62  


 


Blood Pressure   





[Right]   


 


O2 Sat by Pulse  97 97





Oximetry   














  19





  07:35 07:37 07:40


 


Temperature   


 


Pulse Rate 83 81 84


 


Respiratory   





Rate   


 


Blood Pressure  122/58 


 


Blood Pressure   





[Right]   


 


O2 Sat by Pulse 97  97





Oximetry   














  19





  07:45 07:50 07:53


 


Temperature   97.8 F


 


Pulse Rate 105 H 77 


 


Respiratory   18





Rate   


 


Blood Pressure   


 


Blood Pressure   164/92





[Right]   


 


O2 Sat by Pulse 99 97 





Oximetry   














  19





  08:01 08:06 08:11


 


Temperature   


 


Pulse Rate 89 92 H 117 H


 


Respiratory   





Rate   


 


Blood Pressure 164/92  


 


Blood Pressure   





[Right]   


 


O2 Sat by Pulse 99 98 98





Oximetry   














  19





  08:16 08:21 08:26


 


Temperature   


 


Pulse Rate 96 H 103 H 98 H


 


Respiratory   





Rate   


 


Blood Pressure  164/92 


 


Blood Pressure   





[Right]   


 


O2 Sat by Pulse 97 97 97





Oximetry   














  19





  08:31 08:36 08:37


 


Temperature   


 


Pulse Rate 116 H 121 H 114 H


 


Respiratory   





Rate   


 


Blood Pressure   136/60


 


Blood Pressure   





[Right]   


 


O2 Sat by Pulse 96 96 





Oximetry   














  19





  08:41 08:46 08:51


 


Temperature   


 


Pulse Rate 118 H 113 H 116 H


 


Respiratory   





Rate   


 


Blood Pressure   


 


Blood Pressure   





[Right]   


 


O2 Sat by Pulse 97 96 97





Oximetry   














  19





  08:56 09:01 09:06


 


Temperature   


 


Pulse Rate 109 H 108 H 114 H


 


Respiratory   





Rate   


 


Blood Pressure   


 


Blood Pressure   





[Right]   


 


O2 Sat by Pulse 96 97 96





Oximetry   














  19





  09:11 09:16 09:21


 


Temperature   


 


Pulse Rate 113 H 108 H 105 H


 


Respiratory   





Rate   


 


Blood Pressure   


 


Blood Pressure   





[Right]   


 


O2 Sat by Pulse 98 97 97





Oximetry   














  19





  09:26 09:31 09:34


 


Temperature   


 


Pulse Rate 112 H 110 H 117 H


 


Respiratory   





Rate   


 


Blood Pressure   136/60


 


Blood Pressure   





[Right]   


 


O2 Sat by Pulse 97 97 





Oximetry   














  19





  09:36 09:39 09:41


 


Temperature   


 


Pulse Rate 122 H 113 H 115 H


 


Respiratory   





Rate   


 


Blood Pressure  132/58 


 


Blood Pressure   





[Right]   


 


O2 Sat by Pulse 98  98





Oximetry   














  19





  09:46 09:51 09:56


 


Temperature   


 


Pulse Rate 108 H 116 H 105 H


 


Respiratory   





Rate   


 


Blood Pressure   


 


Blood Pressure   





[Right]   


 


O2 Sat by Pulse 98 99 99





Oximetry   














  19





  10:01 10:06 10:11


 


Temperature   


 


Pulse Rate 115 H 112 H 105 H


 


Respiratory   





Rate   


 


Blood Pressure   


 


Blood Pressure   





[Right]   


 


O2 Sat by Pulse 99 99 99





Oximetry   














  19





  10:16 10:21 10:26


 


Temperature   


 


Pulse Rate 115 H 105 H 115 H


 


Respiratory   





Rate   


 


Blood Pressure   


 


Blood Pressure   





[Right]   


 


O2 Sat by Pulse 99 98 99





Oximetry   














  19





  10:31 10:36 10:41


 


Temperature   


 


Pulse Rate 122 H 105 H 108 H


 


Respiratory   





Rate   


 


Blood Pressure   


 


Blood Pressure   





[Right]   


 


O2 Sat by Pulse 99 99 99





Oximetry   














  19





  10:46 10:51 10:56


 


Temperature   


 


Pulse Rate 107 H 107 H 106 H


 


Respiratory   





Rate   


 


Blood Pressure   


 


Blood Pressure   





[Right]   


 


O2 Sat by Pulse 97 98 97





Oximetry   














  19





  11:01 11:06 11:11


 


Temperature   


 


Pulse Rate 104 H 104 H 100 H


 


Respiratory   





Rate   


 


Blood Pressure   


 


Blood Pressure   





[Right]   


 


O2 Sat by Pulse 97 97 98





Oximetry   














  19





  11:16 11:21 11:26


 


Temperature   


 


Pulse Rate 103 H 101 H 99 H


 


Respiratory   





Rate   


 


Blood Pressure   


 


Blood Pressure   





[Right]   


 


O2 Sat by Pulse 98 97 97





Oximetry   














  19





  11:31 11:32 11:36


 


Temperature   


 


Pulse Rate 103 H 99 H 94 H


 


Respiratory   





Rate   


 


Blood Pressure  132/72 


 


Blood Pressure   





[Right]   


 


O2 Sat by Pulse 97  96





Oximetry   














  19





  11:38 11:41 11:46


 


Temperature   


 


Pulse Rate 95 H 104 H 96 H


 


Respiratory   





Rate   


 


Blood Pressure 125/61  


 


Blood Pressure   





[Right]   


 


O2 Sat by Pulse  97 96





Oximetry   














  19





  11:51 11:56 12:01


 


Temperature   


 


Pulse Rate 93 H 92 H 107 H


 


Respiratory   





Rate   


 


Blood Pressure   


 


Blood Pressure   





[Right]   


 


O2 Sat by Pulse 97 97 98





Oximetry   














  19





  12:06 12:10 12:12


 


Temperature  98.1 F 


 


Pulse Rate 103 H  104 H


 


Respiratory  18 





Rate   


 


Blood Pressure   


 


Blood Pressure   





[Right]   


 


O2 Sat by Pulse 97  97





Oximetry   














  19





  12:17 12:22 12:27


 


Temperature   


 


Pulse Rate 98 H 103 H 103 H


 


Respiratory   





Rate   


 


Blood Pressure   


 


Blood Pressure   





[Right]   


 


O2 Sat by Pulse 98 98 99





Oximetry   














  19





  12:32 12:37 12:39


 


Temperature   


 


Pulse Rate 104 H 105 H 109 H


 


Respiratory   





Rate   


 


Blood Pressure   126/59


 


Blood Pressure   





[Right]   


 


O2 Sat by Pulse 98 98 





Oximetry   














  19





  12:42 12:47 12:52


 


Temperature   


 


Pulse Rate 100 H 105 H 105 H


 


Respiratory   





Rate   


 


Blood Pressure   


 


Blood Pressure   





[Right]   


 


O2 Sat by Pulse 97 98 98





Oximetry   














  19





  12:57 13:02 13:07


 


Temperature   


 


Pulse Rate 98 H 98 H 100 H


 


Respiratory   





Rate   


 


Blood Pressure   


 


Blood Pressure   





[Right]   


 


O2 Sat by Pulse 97 97 97





Oximetry   














  19





  13:12 13:17 13:22


 


Temperature   


 


Pulse Rate 98 H 102 H 100 H


 


Respiratory   





Rate   


 


Blood Pressure   


 


Blood Pressure   





[Right]   


 


O2 Sat by Pulse 97 99 99





Oximetry   














- Exam


Breasts: deferred


Cardiovascular: Regular rate


Lungs: Normal air movement


Abdomen: Absent: tenderness


Vulva: both: normal


Uterus: Present: fundal height above umbilicus.  Absent: tenderness


FHR: category 1


Uterine Contraction Monitor Mode: External


Cervical Dilatation: 4.5


Cervical Effacement Percentage: 70


Fetal station: -2


Uterine Contraction Pattern: Irregular


Extremities: edema (+1)





- Labs


Labs: 


                                  Abnormal Labs











  11/15/19 11/15/19 11/15/19





  21:52 21:52 21:52


 


WBC   11.5 H 


 


MCV   78 L 


 


MCH   25 L 


 


RDW   19.8 H 


 


Creatinine    0.6 L


 


Magnesium   


 


AST    44 H


 


Lactate Dehydrogenase    713 H


 


Ur Specific Gravity  1.036 H  














  19





  03:23 09:48


 


WBC  


 


MCV  


 


MCH  


 


RDW  


 


Creatinine  


 


Magnesium  2.60 H  4.40 H


 


AST  


 


Lactate Dehydrogenase  


 


Ur Specific Gravity  








                         Laboratory Results - last 24 hr











  11/15/19 11/15/19 11/15/19





  02:44 21:52 21:52


 


WBC   


 


RBC   


 


Hgb   


 


Hct   


 


MCV   


 


MCH   


 


MCHC   


 


RDW   


 


Plt Count   


 


Creatinine   


 


Estimated GFR   


 


Uric Acid   


 


Magnesium   


 


AST   


 


ALT   


 


Lactate Dehydrogenase   


 


Urine Color   Yellow 


 


Urine Turbidity   Clear 


 


Urine pH   5.0 


 


Ur Specific Gravity   1.036 H 


 


Urine Protein   100 mg/dl 


 


Urine Glucose (UA)   Neg 


 


Urine Ketones   Tr 


 


Urine Blood   Neg 


 


Urine Nitrite   Neg 


 


Urine Bilirubin   Neg 


 


Urine Urobilinogen   < 2.0 


 


Ur Leukocyte Esterase   Neg 


 


Urine WBC (Auto)   1.0 


 


Urine RBC (Auto)   3.0 


 


U Epithel Cells (Auto)   1.0 


 


Urine Mucus   3+ 


 


Urine Opiates Screen  Presumptive negative  


 


Urine Methadone Screen  Presumptive negative  


 


Ur Barbiturates Screen  Presumptive negative  


 


Ur Phencyclidine Scrn  Presumptive negative  


 


Ur Amphetamines Screen  Presumptive negative  


 


U Benzodiazepines Scrn  Presumptive negative  


 


Urine Cocaine Screen  Presumptive negative  


 


U Marijuana (THC) Screen  Presumptive negative  


 


Drugs of Abuse Note  Disclamer  


 


Syphilis IgG Antibody   


 


Blood Type    A POSITIVE


 


Antibody Screen    Negative














  11/15/19 11/15/19 11/15/19





  21:52 21:52 21:52


 


WBC  11.5 H  


 


RBC  4.25  


 


Hgb  10.4  


 


Hct  33.0  


 


MCV  78 L  


 


MCH  25 L  


 


MCHC  32  


 


RDW  19.8 H  


 


Plt Count  347  


 


Creatinine   0.6 L 


 


Estimated GFR   > 60 


 


Uric Acid   5.9 


 


Magnesium   


 


AST   44 H 


 


ALT   21 


 


Lactate Dehydrogenase   713 H 


 


Urine Color   


 


Urine Turbidity   


 


Urine pH   


 


Ur Specific Gravity   


 


Urine Protein   


 


Urine Glucose (UA)   


 


Urine Ketones   


 


Urine Blood   


 


Urine Nitrite   


 


Urine Bilirubin   


 


Urine Urobilinogen   


 


Ur Leukocyte Esterase   


 


Urine WBC (Auto)   


 


Urine RBC (Auto)   


 


U Epithel Cells (Auto)   


 


Urine Mucus   


 


Urine Opiates Screen   


 


Urine Methadone Screen   


 


Ur Barbiturates Screen   


 


Ur Phencyclidine Scrn   


 


Ur Amphetamines Screen   


 


U Benzodiazepines Scrn   


 


Urine Cocaine Screen   


 


U Marijuana (THC) Screen   


 


Drugs of Abuse Note   


 


Syphilis IgG Antibody    Non-reactive


 


Blood Type   


 


Antibody Screen   














  19





  03:23 09:48


 


WBC  


 


RBC  


 


Hgb  


 


Hct  


 


MCV  


 


MCH  


 


MCHC  


 


RDW  


 


Plt Count  


 


Creatinine  


 


Estimated GFR  


 


Uric Acid  


 


Magnesium  2.60 H  4.40 H


 


AST  


 


ALT  


 


Lactate Dehydrogenase  


 


Urine Color  


 


Urine Turbidity  


 


Urine pH  


 


Ur Specific Gravity  


 


Urine Protein  


 


Urine Glucose (UA)  


 


Urine Ketones  


 


Urine Blood  


 


Urine Nitrite  


 


Urine Bilirubin  


 


Urine Urobilinogen  


 


Ur Leukocyte Esterase  


 


Urine WBC (Auto)  


 


Urine RBC (Auto)  


 


U Epithel Cells (Auto)  


 


Urine Mucus  


 


Urine Opiates Screen  


 


Urine Methadone Screen  


 


Ur Barbiturates Screen  


 


Ur Phencyclidine Scrn  


 


Ur Amphetamines Screen  


 


U Benzodiazepines Scrn  


 


Urine Cocaine Screen  


 


U Marijuana (THC) Screen  


 


Drugs of Abuse Note  


 


Syphilis IgG Antibody  


 


Blood Type  


 


Antibody Screen

## 2019-11-16 NOTE — XRAY REPORT
CHEST 1 VIEW 



INDICATION: 

shortness of breath/difficulty breathing.



COMPARISON: 

None.



FINDINGS:

Support devices: None.



Heart: Within normal limits. 

Lungs/Pleura: No acute air space or interstitial disease. 



Additional findings: None.



IMPRESSION: No acute abnormality.



Signer Name: Mati Lemus MD 

Signed: 11/16/2019 2:39 AM

 Workstation Name: [id number]

## 2019-11-17 LAB
HCT VFR BLD CALC: 25.8 % (ref 30.3–42.9)
HGB BLD-MCNC: 8.3 GM/DL (ref 10.1–14.3)

## 2019-11-17 PROCEDURE — 3E0234Z INTRODUCTION OF SERUM, TOXOID AND VACCINE INTO MUSCLE, PERCUTANEOUS APPROACH: ICD-10-PCS | Performed by: OBSTETRICS & GYNECOLOGY

## 2019-11-17 RX ADMIN — MORPHINE SULFATE PRN MG: 2 INJECTION, SOLUTION INTRAMUSCULAR; INTRAVENOUS at 02:08

## 2019-11-17 RX ADMIN — KETOROLAC TROMETHAMINE PRN MG: 30 INJECTION, SOLUTION INTRAMUSCULAR at 00:14

## 2019-11-17 RX ADMIN — CEFAZOLIN SCH MLS/HR: 330 INJECTION, POWDER, FOR SOLUTION INTRAMUSCULAR; INTRAVENOUS at 02:04

## 2019-11-17 RX ADMIN — CEFAZOLIN SCH MLS/HR: 330 INJECTION, POWDER, FOR SOLUTION INTRAMUSCULAR; INTRAVENOUS at 10:47

## 2019-11-17 RX ADMIN — MORPHINE SULFATE PRN MG: 2 INJECTION, SOLUTION INTRAMUSCULAR; INTRAVENOUS at 10:52

## 2019-11-17 RX ADMIN — KETOROLAC TROMETHAMINE PRN MG: 30 INJECTION, SOLUTION INTRAMUSCULAR at 18:08

## 2019-11-17 RX ADMIN — MORPHINE SULFATE PRN MG: 2 INJECTION, SOLUTION INTRAMUSCULAR; INTRAVENOUS at 15:00

## 2019-11-17 NOTE — PROGRESS NOTE
Assessment and Plan





- Patient Problems


(1)  delivery delivered


Current Visit: Yes   Status: Acute   


Plan to address problem: 


Doing well, will transfer [facility] this pm and advance diet








(2) Pre-eclampsia


Current Visit: Yes   Status: Acute   





(3) 38 weeks gestation of pregnancy


Current Visit: Yes   Status: Resolved   





(4) HSV-2 (herpes simplex virus 2) infection


Current Visit: Yes   Status: Chronic   





(5) BMI 40.0-44.9, adult


Current Visit: Yes   Status: Chronic   





(6) History of depression


Current Visit: Yes   Status: Chronic   





Subjective





- Subjective


Date of service: 19


Principal diagnosis: POD#1 s/p C/S Preeclampsia, breech


Interval history: 





Patient was noted to have elevated BP's in office and was instrd to go to [facility] 

for evaluation, she presented several hours later complaining of HA with sligh

tly elevated BP's compared to those noted earlier in the pregnancy. With her 

history of preeclampsia ~1year ago and now increased BP's and HA will admit for 

induction. 





Past Pregnancy History 


   :      2


 Delivery Date: 10/07/2018


  Gestational Age: 37 weeks


  Anesthesia:  epidural


  Delivery Type: Vaginal


  Birth Weight: 6.75 lbs


  Gender: male


  Location: [facility]


  Complications: pre-eclampsia/eclampsia w/Chtn





APGAR - 1 minute: 8


  5 minutes: 9








Past Medical History:


   Reviewed history from 2018 and no changes required:


      asthma - Dr. peterson 


      Asthma





Past Surgical History:


   Reviewed history from 2018 and no changes required:


      negative





Past Medical History 


Anesthesia Complications: negative


Anemia: negative


Autoimmune Disorder: negative


Bleeding Disorder: negative


Blood Transfusions: negative


Breast Disease: negative


Diabetes: negative


Heart Disease: negative


Hypertension: positive, HTN of pregnancy


Hepatitis/Liver Disease: negative


Kidney Disease/UTI: negative


Neurologic/Epilepsy/Migraines: negative


Phlebitis/Varicosities: negative


Psychiatric: negative


Pulmonary Disease/Asthma: negative


Thyroid Disease: negative


Hospitalizations: negative


Surgery (Non-gyn): negative


Abnormal PAP: negative


SPENCER Exposure: negative


Infertility: negative


Uterine Anomaly: negative


Uterine Surgery (not C/S): negative


Other Gynecologic Problems: negative





Social Hx: single


no ETOH/drugs/smoking





Infection History 


Hx of STD: none


HIV Risk Eval: low risk


Hepatitis B Risk Eval: low risk


Personal hx. of genital herpes: no


Partner hx. of genital herpes: no


Rash, Viral, or Febrile illness since last LMP? no


Varicella/Chicken Pox Status: Unknown


TB Risk: no


Infection History Comments: Has not had Chicken pox





Genetic History 


 Congenital Heart Defect:


    Mom: no  Dad: no


Canavan Disease:


    Mom: no  Dad: no


Thalassemia


    Mom: no  Dad: no


Neural Tube Defect


    Mom: no  Dad: no


Down's Syndrome


    Mom: no  Dad: no


William-Sachs


    Mom: no  Dad: no


Sickle Cell Disease/Trait


    Mom: no  Dad: no


Hemophilia


    Mom: no  Dad: no


Muscular Dystrophy


    Mom: no  Dad: no


Cystic Fibrosis


    Mom: no  Dad: no


Meliza Chorea


    Mom: no  Dad: no


Mental Retardation


    Mom: no  Dad: no


Fragile X


    Mom: no  Dad: no


Other Genetic/Chromosomal Disorder


    Mom: no  Dad: no


Child w/other birth defect


    Mom: no  Dad: no





Enviromental Exposures 


Xray Exposure: no


Medication, drug, or alcohol use since LMP: no


Chemical/Other Exposure: no


Exposure to Cat Liter: no


Hx of Parvovirus (Fifth Disease): no


Active Medications (reviewed today):


VALACYCLOVIR HCL 1 GM ORAL TABLET (VALACYCLOVIR HCL) 1 tab po qd


PNV () 


VENTOLIN () 


PROAIR () 


ZOLOFT 50 MG ORAL TABLET (SERTRALINE HCL) 1 po in the morning





Current Allergies (reviewed today):


No known allergies





Patient: YOLANDA SANTOS


ID: [id number]


Note: All result statuses are Final unless otherwise noted.





Tests: (1) RPR, Rfx Qn RPR/Confirm TP ([id number])


  RPR                       Non Reactive                Non Reactive     *1





Tests: (2) Panel 594584 ([id number])


 HIV Screen 4th Generation wRfx


                            Non Reactive                Non Reactive     *2


    


Performed At:  [facility] [id number]


Peter Smith MD     Phone:  [phone]





Note: An exclamation gunjan (!) indicates a result that was not dispersed into the

flowsheet.


Document Creation Date: 2019 12:13 PM





Tests: (1) Ct, Ng, Trich vag by TUCKER ([id number])


  Order Note: Clinical Information: SRC:VR                     SRC:UR


! Chlamydia by TUCKER          Negative                    Negative         *1


! Gonococcus by TUCKER         Negative                    Negative         *2


! Trich vag by TUCKER          Negative                    Negative         *3





Tests: (2) Strep Gp B TUCKER ([id number])


! Strep Gp B TUCKER            Negative                    Negative         *4


    Centers for Disease Control and Prevention (CDC) and American Congress


of Obstetricians and Gynecologists (ACOG) guidelines for prevention of


 group B streptococcal (GBS) disease specify co-collection of


a vaginal and rectal swab specimen to maximize sensitivity of GBS


detection. Per the CDC and ACOG, swabbing both the lower vagina and


rectum substantially increases the yield of detection compared with


sampling the vagina alone.


Penicillin G, ampicillin, or cefazolin are indicated for intrapartum


prophylaxis of  GBS colonization. Reflex susceptibility


testing should be performed prior to use of clindamycin only on GBS


isolates from penicillin-allergic women who are considered a high risk


for anaphylaxis. Treatment with vancomycin without additional testing


is warranted if resistance to clindamycin is noted.


   


    


Performed At:  [facility], [facility] [id number]


Peter Smith MD     Phone:  [phone]





Note: An exclamation gunjan (!) indicates a result that was not dispersed into the

flowsheet.


Document Creation Date: 2019 10:12 AM


_______________________________________________________________________





(1) Order result status: Final


Collection or observation date-time: 2019 04:19


Patient reports: appetite normal, flatus





Objective





- Vital Signs


Latest vital signs: 


                                   Vital Signs











  Temp Pulse Resp BP Pulse Ox


 


 19 10:21   101 H   132/60 


 


 19 09:15   109 H   126/61 


 


 19 08:45   109 H   131/62 


 


 19 08:26   107 H    95


 


 19 08:24   105 H    94


 


 19 08:21   108 H    95


 


 19 08:16   110 H    96


 


 19 08:15   105 H   138/66 


 


 19 08:11   118 H    96


 


 19 08:06   106 H    95


 


 19 08:01   107 H    96


 


 19 07:56   104 H    97


 


 19 07:51   106 H    97


 


 19 07:46   106 H    97


 


 19 07:45   106 H   139/66 


 


 1719 07:41   108 H    97


 


 19 07:36   106 H    97


 


 19 07:31   104 H    97


 


 19 07:30   100 H   141/73 


 


 1719 07:26   99 H    96


 


 19 07:21   102 H    96


 


 19 07:16   108 H    97


 


 19 07:15   101 H   138/79 


 


 19 07:11   102 H    97


 


 19 07:06   101 H    98


 


 19 07:01   109 H    98


 


 19 06:56   101 H    97


 


 19 06:51   99 H    97


 


 19 06:46   100 H    97


 


 19 06:45   103 H   135/76 


 


 19 06:41   107 H    98


 


 19 06:36   98 H    96


 


 19 06:31   95 H    96


 


 19 06:26   104 H    97


 


 19 06:21   104 H    96


 


 19 06:19   98 H  18  


 


 19 06:16   101 H    97


 


 19 06:15   103 H   135/83 


 


 19 06:11   97 H    95


 


 19 06:06   103 H    96


 


 19 06:01   108 H    95


 


 19 05:58   98 H    94


 


 19 05:56   109 H    94


 


 19 05:52   98 H    93


 


 19 05:51   98 H    97


 


 19 05:45   97 H   129/59  96


 


 1719 05:40   98 H    97


 


 19 05:37   96 H    93


 


 19 05:35   96 H    96


 


 19 05:30   94 H    96


 


 1719 05:25   93 H    96


 


 1719 05:20   95 H    96


 


 19 05:15   93 H   125/58  96


 


 19 05:10   95 H    96


 


 1719 05:05   94 H    96


 


 19 05:00   92 H    95


 


 19 04:55   95 H    96


 


 19 04:50   97 H    97


 


 11/17/19 04:45   95 H   127/61  97


 


 17/19 04:40   97 H    97


 


 17/19 04:35   97 H    97


 


 17/19 04:30   101 H    98


 


 17/19 04:25   99 H    97


 


 17/19 04:20   96 H    97


 


 17/19 04:15   91 H   118/61  97


 


 17/19 04:10   95 H    97


 


 17/19 04:05   94 H    97


 


 17/19 04:00   94 H    96


 


 17/19 03:55   98 H    96


 


 17/19 03:50   93 H    97


 


 17/19 03:45   92 H   123/56  97


 


 17/19 03:40   94 H    97


 


 17/19 03:35   95 H    97


 


 17/19 03:30   93 H    97


 


 17/19 03:25   95 H    97


 


 17/19 03:20   96 H    97


 


 17/19 03:15   93 H   120/58  97


 


 17/19 03:10   97 H    97


 


 17/19 03:05   97 H    96


 


 17/19 03:02   100 H    93


 


 17/19 03:00   103 H    97


 


 17/19 02:56   99 H    93


 


 17/19 02:55   99 H    97


 


 17/19 02:50   88    97


 


 17/19 02:45   90   120/56  97


 


 17/19 02:40   92 H    97


 


 17/19 02:35   90    97


 


 17/19 02:30   89    97


 


 17/19 02:25   88    97


 


 17/19 02:20   86    97


 


 17/19 02:15   89   118/59  97


 


 17/19 02:10   91 H    97


 


 17/19 02:05   92 H    97


 


 17/19 02:00   88    97


 


 17/19 01:55   86    97


 


 17/19 01:50   88    97


 


 17/19 01:45   92 H   123/56  96


 


 17/19 01:40   89    96


 


 17/19 01:35   87    96


 


 17/19 01:30   90    96


 


 17/19 01:25   84    97


 


 17/19 01:20   85    97


 


 17/19 01:15   101 H   127/60  97


 


 19 01:10   88    98


 


 19 01:05   93 H    97


 


 19 01:00   86    97


 


 19 00:55   87    97


 


 19 00:50   85    97


 


 19 00:45   86   129/68  97


 


 19 00:40   89    97


 


 19 00:35   91 H    97


 


 19 00:30   83    97


 


 19 00:25   84    97


 


 19 00:20   98 H  18   97


 


 19 00:15   83   136/76  97


 


 19 00:14    18  


 


 19 00:10   85    97


 


 19 00:05   85    97


 


 19 00:00   84    97


 


 19 23:55   81    97


 


 19 23:50   83    97


 


 19 23:45   82   136/73  97


 


 19 23:40   83    97


 


 19 23:35   81    98


 


 19 23:30   79    97


 


 19 23:25   81    98


 


 19 23:20   87    98


 


 19 23:15   89    98


 


 19 23:10   79    98


 


 19 23:08   80   142/68 


 


 19 23:05   79    98


 


 19 23:00   81    98


 


 19 22:55   79    97


 


 19 22:53   75   141/65 


 


 19 22:50   79    98


 


 19 22:45   80    98


 


 19 22:40   78    98


 


 19 22:38   79   140/70 


 


 19 22:35   79    98


 


 19 22:30   80    98


 


 19 22:25   80    98


 


 19 22:22   75   139/71 


 


 19 22:21   76   139/71 


 


 19 22:20   76    98


 


 19 22:15   77    98


 


 19 22:10  97.5 F L  93 H    99


 


 19 22:08   74   133/68 


 


 19 22:05   75    98


 


 19 21:53   80   132/67 


 


 19 21:48  97.4 F L   18  


 


 19 21:32   81    98


 


 19 21:27   74    98


 


 19 21:22   71   120/66  98


 


 19 21:17   69    98


 


 19 21:12   74    98


 


 19 21:08   73   112/60 


 


 19 21:07   69    97


 


 19 21:02   73    98


 


 19 20:29   68  22  113/58 


 


 19 20:20   78  22  115/58 


 


 19 20:05   69  16  112/54 


 


 19 20:00   72  16  111/54 


 


 19 19:50   75  22  113/55 


 


 19 19:45   79  20  108/47 


 


 19 19:39    18  108/41 


 


 19 19:36   87  19  106/48 


 


 19 19:31  97.7 F  78  18  106/46  99


 


 19 18:16   90    99


 


 19 18:11   89    100


 


 19 18:06   78    100


 


 19 18:03   78   129/68 


 


 19 18:01   77    99


 


 19 17:56   75    99


 


 19 17:51   76    99


 


 19 17:46   77    99


 


 19 17:41   76    98


 


 19 17:36   77    98


 


 19 17:33   78   133/71 


 


 19 17:31   79    98


 


 19 17:26   77    99


 


 19 17:21   75    99


 


 19 17:16   81    99


 


 19 17:11   77    99


 


 19 17:06   79    99


 


 19 17:05   93 H   131/59 


 


 19 17:01   83    98


 


 19 16:56   81    98


 


 19 16:51   83    98


 


 19 16:46   79    98


 


 19 16:41   82    98


 


 19 16:36   95 H    99


 


 19 16:33   80   117/59 


 


 19 16:31   82    98


 


 19 16:26   79    98


 


 19 16:21   94 H    98


 


 19 16:16   84    98


 


 19 16:11   85    97


 


 19 16:06  97.9 F  84  18   98


 


 19 16:01   85    98


 


 19 15:56   87    98


 


 19 15:55   84   119/55 


 


 19 15:51   86    98


 


 19 15:46   92 H    98


 


 19 15:41   97 H    98


 


 19 15:40   90   121/60 


 


 19 15:36   84    97


 


 19 15:31   93 H    98


 


 19 15:26   89    98


 


 19 15:21   93 H   120/59  98


 


 19 15:16   97 H   122/66  98


 


 19 15:11   100 H   128/61  98


 


 19 15:06   105 H    98


 


 19 15:01   96 H   132/75  99


 


 19 14:56   112 H    99


 


 19 14:50   108 H   139/83 


 


 19 14:45   102 H    99


 


 19 14:40   112 H    99


 


 19 14:38   83    78 L


 


 19 14:34   91 H    99


 


 19 14:31   96 H   123/63 


 


 19 14:29   96 H    98


 


 19 14:24   92 H    99


 


 19 14:19      93


 


 19 13:38   99 H   136/71 


 


 19 13:37   102 H    98


 


 19 13:32   102 H    98


 


 19 13:27   104 H    98


 


 19 13:22   100 H    99


 


 19 13:17   102 H    99


 


 19 13:12   98 H    97


 


 19 13:07   100 H    97


 


 19 13:02   98 H    97


 


 19 12:57   98 H    97


 


 19 12:52   105 H    98


 


 19 12:47   105 H    98


 


 19 12:42   100 H    97


 


 19 12:39   109 H   126/59 


 


 19 12:37   105 H    98


 


 19 12:32   104 H    98


 


 19 12:27   103 H    99


 


 19 12:22   103 H    98


 


 19 12:17   98 H    98


 


 19 12:12   104 H    97


 


 19 12:10  98.1 F   18  


 


 19 12:06   103 H    97


 


 19 12:01   107 H    98


 


 19 11:56   92 H    97


 


 19 11:51   93 H    97


 


 19 11:46   96 H    96


 


 19 11:41   104 H    97


 


 19 11:38   95 H   125/61 


 


 19 11:36   94 H    96


 


 19 11:32   99 H   132/72 


 


 19 11:31   103 H    97


 


 19 11:26   99 H    97


 


 19 11:21   101 H    97


 


 19 11:16   103 H    98


 


 19 11:11   100 H    98


 


 19 11:06   104 H    97


 


 19 11:01   104 H    97


 


 19 10:56   106 H    97


 


 19 10:51   107 H    98


 


 19 10:46   107 H    97


 


 19 10:41   108 H    99


 


 19 10:36   105 H    99


 


 19 10:31   122 H    99








                                Intake and Output











 19





 22:59 06:59 14:59


 


Intake Total 2237.100  


 


Output Total 1400 650 


 


Balance 837.100 -650 


 


Intake:   


 


  IV 2237.100  


 


    MAGNESIUM SULFATE 40GM/ 707.5  





    1000ML 40 gm In 1,000 ml   





    @ 2 GM/HR 50 mls/hr IV AS   





    DIRECT VIRGEN Rx#:[id number]   


 


    PITOCin/NS 30 UNIT/500ML 29.600  





    30 units In 500 ml @ 4   





    MILLIUNITS/MIN 4 mls/hr   





    IV TITR VIRGEN Rx#:[id number]   


 


Output:   


 


  Urine 1400 650 


 


    Indwelling Catheter 1100 650 


 


    Uretheral ([place]) 200  


 


Other:   


 


  Total, Output Amount 100 200 


 


  Estimated Blood Loss 400  














- Exam


Breasts: Present: normal


Cardiovascular: Present: Regular rate


Lungs: Present: Clear to auscultation, Normal air movement


Abdomen: Present: soft, distention, normal bowel sounds.  Absent: tenderness


Uterus: Present: fundal height below umbilicus.  Absent: tenderness


Extremities: Present: edema (1+, SCD inplace)


Incision: Present: dressed





- Labs


Labs: 


                              Abnormal lab results











  19 Range/Units





  09:48 16:20 21:40 


 


Hgb     (10.1-14.3)  gm/dl


 


Hct     (30.3-42.9)  %


 


Magnesium  4.40 H  5.30 H  4.20 H  (1.7-2.3)  mg/dL














  19 Range/Units





  04:26 04:26 


 


Hgb   8.3 L  (10.1-14.3)  gm/dl


 


Hct   25.8 L D  (30.3-42.9)  %


 


Magnesium  5.60 H   (1.7-2.3)  mg/dL

## 2019-11-18 LAB
BASOPHILS # (AUTO): 0 K/MM3 (ref 0–0.1)
BASOPHILS NFR BLD AUTO: 0.5 % (ref 0–1.8)
EOSINOPHIL # BLD AUTO: 0.1 K/MM3 (ref 0–0.4)
EOSINOPHIL NFR BLD AUTO: 1 % (ref 0–4.3)
HCT VFR BLD CALC: 27.6 % (ref 30.3–42.9)
HGB BLD-MCNC: 8.7 GM/DL (ref 10.1–14.3)
LYMPHOCYTES # BLD AUTO: 1.9 K/MM3 (ref 1.2–5.4)
LYMPHOCYTES NFR BLD AUTO: 17.3 % (ref 13.4–35)
MCHC RBC AUTO-ENTMCNC: 31 % (ref 30–34)
MCV RBC AUTO: 79 FL (ref 79–97)
MONOCYTES # (AUTO): 0.4 K/MM3 (ref 0–0.8)
MONOCYTES % (AUTO): 4 % (ref 0–7.3)
PLATELET # BLD: 320 K/MM3 (ref 140–440)
RBC # BLD AUTO: 3.48 M/MM3 (ref 3.65–5.03)

## 2019-11-18 RX ADMIN — IBUPROFEN PRN MG: 800 TABLET, FILM COATED ORAL at 15:20

## 2019-11-18 RX ADMIN — FERROUS SULFATE TAB 325 MG (65 MG ELEMENTAL FE) SCH MG: 325 (65 FE) TAB at 22:15

## 2019-11-18 RX ADMIN — FERROUS SULFATE TAB 325 MG (65 MG ELEMENTAL FE) SCH MG: 325 (65 FE) TAB at 10:33

## 2019-11-18 RX ADMIN — HYDRALAZINE HYDROCHLORIDE ONE MG: 20 INJECTION INTRAMUSCULAR; INTRAVENOUS at 18:29

## 2019-11-18 RX ADMIN — HYDRALAZINE HYDROCHLORIDE ONE: 20 INJECTION INTRAMUSCULAR; INTRAVENOUS at 18:55

## 2019-11-18 RX ADMIN — IBUPROFEN PRN MG: 800 TABLET, FILM COATED ORAL at 05:36

## 2019-11-18 NOTE — PROGRESS NOTE
Assessment and Plan





- Patient Problems


(1)  delivery delivered


Onset Date: ~19   Current Visit: Yes   Status: Acute   


Plan to address problem: 


Pt sleeping C/O dressing itching Will be removed this AM. Encouraged pt to 

shower.  -130/70-60 FF below umb Lochia scant Dressing D&I to be removed. 

H&H  CBC ordered for this AM. Stable s/p c/s PreE cont. Labetalol 200mg BID 

P: cont pathway Advance diet and activity as tolerated. 








Subjective





- Subjective


Date of service: 19 (resting)


Principal diagnosis: POD#2 s/p C/S Preeclampsia, breech


Patient reports: appetite normal, voiding normally, pain well controlled, 

ambulating normally


[place]: doing well





Objective





- Vital Signs


Latest vital signs: 


                                   Vital Signs











  Temp Pulse Resp BP BP Pulse Ox


 


 19 05:55  98.4 F  108 H  20  137/58   94


 


 19 01:12  98.6 F  110 H  20  139/78   96


 


 19 22:10   90   139/80  


 


 19 21:09  98 F  90  18   139/80  96


 


 19 20:03   96 H   140/79  


 


 19 19:55   95 H     97


 


 19 19:50   92 H     96


 


 19 19:45   103 H   141/75   98


 


 19 19:40   95 H     97


 


 19 19:35   102 H     96


 


 19 19:30   96 H     96


 


 19 19:25   102 H     97


 


 19 19:20   102 H     97


 


 19 19:15   104 H   145/80   97


 


 19 19:10   105 H     96


 


 19 19:05   104 H     97


 


 19 19:00   95 H     96


 


 19 18:56   95 H     94


 


 19 18:55   100 H     96


 


 19 18:50   94 H     93


 


 19 18:47   94 H     94


 


 19 18:45   101 H   143/66   97


 


 19 18:40   96 H     96


 


 19 18:35   98 H     96


 


 19 18:30   100 H     97


 


 19 18:29   96 H   141/67  


 


 11/17/19 18:25   99 H     98


 


 17/19 18:20   94 H     96


 


 17/19 18:16   98 H   181/79  


 


 17/19 18:15   99 H     97


 


 17/19 18:10   95 H     98


 


 17/19 18:05   100 H     98


 


 17/19 18:00   106 H     96


 


 17/19 17:55   101 H     97


 


 17/19 17:50   106 H     97


 


 17/19 17:45   104 H   129/56   97


 


 17/19 17:40   103 H     97


 


 17/19 17:35   100 H     97


 


 17/19 17:30   99 H     97


 


 17/19 17:25   110 H     97


 


 17/19 17:20   104 H     97


 


 17/19 17:15   102 H   134/63   97


 


 17/19 17:10   99 H     96


 


 17/19 17:05   99 H     96


 


 17/19 17:00   105 H     96


 


 17/19 16:55   96 H     96


 


 17/19 16:50   94 H     96


 


 17/19 16:46   94 H   162/74  


 


 17/19 16:45   97 H     96


 


 17/19 16:40   95 H     96


 


 17/19 16:35   92 H     96


 


 17/19 16:30   94 H     96


 


 17/19 16:25   94 H     96


 


 17/19 16:20   94 H     97


 


 17/19 16:16   98 H   174/75  


 


 17/19 16:15   103 H     96


 


 17/19 16:10   95 H     96


 


 17/19 16:05   95 H     96


 


 17/19 16:00   94 H     96


 


 17/19 15:55   95 H     97


 


 17/19 15:50   104 H     96


 


 17/19 15:45   97 H   146/70   96


 


 17/19 15:40   103 H     97


 


 17/19 15:07   113 H     96


 


 17/19 15:02   104 H     97


 


 17/19 14:57   102 H     95


 


 17/19 14:52   97 H     94


 


 17/19 14:49   98 H     94


 


 17/19 14:47   99 H     94


 


 17/19 14:45   96 H   137/62  


 


 19 14:42   97 H     94


 


 19 14:37   98 H     95


 


 19 14:36   105 H     94


 


 19 14:32   98 H     95


 


 19 14:31   98 H     94


 


 19 14:27   94 H     95


 


 19 14:22   97 H     95


 


 19 14:17   98 H     95


 


 19 14:15   100 H   132/63   94


 


 19 14:12   103 H     95


 


 19 14:07   105 H     96


 


 19 14:02   102 H     96


 


 19 13:57   102 H     96


 


 19 13:52   103 H     96


 


 19 13:47   107 H     96


 


 19 13:45   104 H   137/63  


 


 19 13:42   105 H     97


 


 19 13:37   107 H     96


 


 19 13:32   109 H     97


 


 19 13:27   102 H     96


 


 19 13:22   105 H     98


 


 19 13:17   107 H     95


 


 19 13:15   105 H   141/67  


 


 19 13:12   101 H     95


 


 19 13:07   99 H     96


 


 19 13:02   101 H     96


 


 19 12:57   92 H     96


 


 19 12:52   94 H     96


 


 19 12:50  98.3 F     


 


 19 12:47   94 H     96


 


 19 12:45   96 H   139/63  


 


 19 12:42   93 H     96


 


 19 12:37   98 H     96


 


 19 12:32   95 H     96


 


 19 12:27   102 H     96


 


 19 12:22   97 H     96


 


 19 12:17   97 H     96


 


 19 12:15   96 H   128/59  


 


 19 12:12   95 H     97


 


 19 12:07   99 H     97


 


 19 12:02   100 H     97


 


 19 11:57   109 H     96


 


 19 11:52   102 H     96


 


 19 11:47   109 H     96


 


 19 11:45   102 H   135/61  


 


 19 11:42   104 H     96


 


 19 11:37   102 H     96


 


 19 11:32   97 H     97


 


 19 11:27   98 H     95


 


 19 11:22   102 H     97


 


 19 11:17   100 H     98


 


 19 11:15   101 H   141/67  


 


 19 11:12   100 H     98


 


 19 11:07   106 H     97


 


 19 11:03  98.0 F     


 


 19 11:02   105 H     97


 


 19 10:57   101 H     97


 


 19 10:47   104 H   134/63  


 


 19 10:45   104 H   134/63  


 


 19 10:21   101 H   132/60  


 


 19 10:20   86   134/63  


 


 19 09:15   109 H   126/61  


 


 19 08:45   109 H   131/62  


 


 19 08:26   107 H     95


 


 19 08:24   105 H     94


 


 19 08:21   108 H     95


 


 19 08:16   110 H     96


 


 19 08:15   105 H   138/66  


 


 19 08:11   118 H     96


 


 19 08:06   106 H     95








                                Intake and Output











 19





 22:59 06:59 14:59


 


Intake Total  480 


 


Output Total 1400 800 


 


Balance -1400 -320 


 


Intake:   


 


  Intake, Free Water  480 


 


Output:   


 


  Urine 1400 800 


 


    Indwelling Catheter 1000 800 


 


    Void 400  


 


Other:   


 


  Total, Output Amount 400 800 


 


  # Voids   


 


    Indwelling Catheter  1 


 


    Void 1 1 














- Exam


Breasts: Present: normal


Lungs: Present: Clear to auscultation


Abdomen: Present: normal appearance, soft, normal bowel sounds


Uterus: Present: normal, fundal height below umbilicus


Extremities: Present: normal


Deep Tendon Reflex Grade: Normal +2


Incision: Present: normal, dry, intact, dressed (to be removed this AM)





- Labs


Labs: 


                              Abnormal lab results











  19 Range/Units





  09:49 15:32 


 


Magnesium  5.30 H  5.30 H  (1.7-2.3)  mg/dL

## 2019-11-19 VITALS — SYSTOLIC BLOOD PRESSURE: 132 MMHG | DIASTOLIC BLOOD PRESSURE: 75 MMHG

## 2019-11-19 RX ADMIN — IBUPROFEN PRN MG: 800 TABLET, FILM COATED ORAL at 09:18

## 2019-11-19 RX ADMIN — FERROUS SULFATE TAB 325 MG (65 MG ELEMENTAL FE) SCH MG: 325 (65 FE) TAB at 09:19

## 2019-11-19 RX ADMIN — FERROUS SULFATE TAB 325 MG (65 MG ELEMENTAL FE) SCH MG: 325 (65 FE) TAB at 12:31

## 2019-11-19 NOTE — DISCHARGE SUMMARY
Providers





- Providers


Date of Admission: 


11/15/19 22:30





Date of discharge: 19 (pt OOB; desires d/c)


Attending physician: 


BLADE HARTMAN





                                        





19 19:25


Consult to Lactation Consultant [CONS] Routine 


   Reason For Exam: 











Primary care physician: 


BLADE HARTMAN








Hospitalization


Reason for admission: induction of labor


Delivery:  (BREECH)


Procedure: primary low transverse


Episiotomy: none


Laceration: none


Incision: normal, dry, intact


Other postpartum procedures: none


Postpartum complications: none


Discharge diagnosis: IUP at term delivered


[place] baby: male (pt will call to [facility])


Hospital course: 


uncomplicated  section - BREECH





Pt OOB caring for NB Breast feeding w/o issue VSS





Condition at discharge: Good


Disposition: DC-01 TO HOME OR SELFCARE





- Discharge Diagnoses


(1)  delivery delivered


Status: Acute   Comment: RTO 1 week postoperative visit   





(2) Pre-eclampsia


Status: Acute   Comment: RTO 1 week BP check Will d/c on Labetalol   





Plan





- Discharge Medications


Prescriptions: 


Lidocain2.5%/Prilocai2.5% [Emla] 5 gm TP PRN #1 tube


labetaloL [Labetalol 200mg TAB] 200 mg PO BID #60 tablet


Ibuprofen [Motrin 800 MG tab] 800 mg PO TID PRN #30 tablet


 PRN Reason: Pain





- Provider Discharge Summary


Activity: routine, no sex for 6 weeks, no heavy lifting 4 weeks, no strenuous 

exercise


Diet: other (no salt)


Instructions: routine


Additional instructions: 


[]  Smoking cessation referral if applicable(refer to patient education folder 

for contact #)


[]  Refer to [facility] Booklet








Call your doctor immediately for:


* Fever > 100.5


* Heavy vaginal bleeding ( >1 pad per hour)


* Severe persistent headache


* Shortness of breath


* Reddened, hot, painful area to leg or breast


* Drainage or odor from incision.





* Keep incision clean and dry at all times and follow doctor's instructions 

regarding bathing/showering











- Follow up plan


Follow up: 


BLADE HARTMAN MD [Primary Care Provider] - 7 Days


(Congratulations! 


Please call [phone] to schedule your postoperative visit in 1 week and your

son's circumcision in 1 week.


Bring the EMLA cream with you to his visit. Do NOT use at home.


Take medications as prescribed.


Call with headache, not relieved with Tylenol, blurred vision, chest pain.


Call with concerns.)

## 2019-12-27 NOTE — HISTORY AND PHYSICAL REPORT
History of Present Illness


Date of examination: 10/07/18 (pt presents with c/o ctx and poss ROM; elevated 

BP noted on admission)


History of present illness: 


EDC Confirmation:  10/27/2018


Gestational Age:  8 3/7 weeks





Past Pregnancy History 


   :      1


   Term Births:      0


   Premature Births:   0


   Living Children:   0


   Para:         0


   Mult. Births:      0


   Prev :   0


   Prev.  attempt?   0


   Aborta:      0


   Elect. Ab:      0


   Spont. Ab:      0


   Ectopics:      0








Past Medical History:


   asthma - Dr. peterson 





Past Surgical History:


   Negative Past Surgical History





Past Medical History 


Surgery (Non-gyn): Negative Past Surgical History








Family Hx: HTN - Mother, MGM


DM - MGM


no knonwn hx cancer





Social Hx: single


no ETOH/drugs/smoking





Infection History 


Hx of STD: none


HIV Risk Eval: low risk


Hepatitis B Risk Eval: low risk


Personal hx. of genital herpes: no


Partner hx. of genital herpes: no


Rash, Viral, or Febrile illness since last LMP? no


Varicella/Chicken Pox Status: Immunized


TB Risk: no





Genetic History 


 Congenital Heart Defect:


    Mom: no  Dad: no


Canavan Disease:


    Mom: no  Dad: no


Thalassemia


    Mom: no  Dad: no


Neural Tube Defect


    Mom: no  Dad: no


Down's Syndrome


    Mom: no  Dad: no


William-Sachs


    Mom: no  Dad: no


Sickle Cell Disease/Trait


    Mom: no  Dad: no


Hemophilia


    Mom: no  Dad: no


Muscular Dystrophy


    Mom: no  Dad: no


Cystic Fibrosis


    Mom: no  Dad: no


Waldo Chorea


    Mom: no  Dad: no


Mental Retardation


    Mom: no  Dad: no


Fragile X


    Mom: no  Dad: no


Other Genetic/Chromosomal Disorder


    Mom: no  Dad: no


Child w/other birth defect


    Mom: no  Dad: no





Enviromental Exposures 


Xray Exposure: no


Medication, drug, or alcohol use since LMP: no


Chemical/Other Exposure: no


Exposure to Cat Liter: no


Hx of Parvovirus (Fifth Disease): no


Occupational Exposure to Children: none


Current Allergies (reviewed today):


No known allergies








Past History





- Obstetrical History


Expected Date of Delivery: 10/27/18


Actual Gestation: 37 Week(s) 1 Day(s) 


: 1


Para: 0


Hx # Term Pregnancies: 0


Number of  Pregnancies: 0


Spontaneous Abortions: 0


Induced : 0


Number of Living Children: 0





Medications and Allergies


 Allergies











Allergy/AdvReac Type Severity Reaction Status Date / Time


 


No Known Allergies Allergy   Unverified 10/02/18 10:48











 Home Medications











 Medication  Instructions  Recorded  Confirmed  Last Taken  Type


 


Aspirin [Aspirin BABY CHEW TAB] 81 mg PO QDAY 10/02/18 10/07/18 1 Day Ago 

History





    ~10/06/18 


 


Ferrous Sulfate [Feosol 325 MG tab] 1 tab PO DAILY 10/02/18 10/07/18 2 Days Ago 

History





    ~18 











Active Meds: 


Active Medications





Ephedrine Sulfate (Ephedrine Sulfate)  10 mg IV Q2M PRN


   PRN Reason: Hypotension


Fentanyl (Sublimaze)  100 mcg IV Q2H PRN


   PRN Reason: Labor Pain


Lactated Ringer's (Lactated Ringers)  1,000 mls @ 125 mls/hr IV AS DIRECT VIRGEN


Oxytocin/Sodium Chloride (Pitocin/Ns 20 Unit/1000ml Drip)  20 units in 1,000 

mls @ 125 mls/hr IV AS DIRECT VIRGEN


Magnesium Sulfate (Magnesium Sulfate 40gm/1000ml)  40 gm in 1,000 mls @ 50 mls/

hr IV AS DIRECT VIRGEN


Magnesium Sulfate (Magnesium Sulfate 4gm/100ml)  4 gm in 100 mls @ 300 mls/hr 

IV ONCE ONE


   Stop: 10/07/18 07:56


Mineral Oil (Mineral Oil)  30 ml PO QHS PRN


   PRN Reason: Constipation


Ondansetron HCl (Zofran)  4 mg IV Q8H PRN


   PRN Reason: Nausea And Vomiting


Terbutaline Sulfate (Brethine)  0.25 mg SUB-Q ONCE PRN


   PRN Reason: Hyperstimulation/Hypertonicity











- Vital Signs


Vital signs: 


 Vital Signs











Pulse BP


 


 96 H  175/126 


 


 10/07/18 06:45  10/07/18 06:45








 











Temp Pulse Resp BP Pulse Ox


 


 98.2 F   80   18   175/95    


 


 10/07/18 06:52  10/07/18 07:32  10/07/18 06:52  10/07/18 07:32   














- Physical Exam


Breasts: Positive: deferred


Cardiovascular: Regular rate, Normal S1, Normal S2


Lungs: Positive: Clear to auscultation, Normal air movement


Abdomen: Positive: normal appearance, soft, normal bowel sounds.  Negative: 

distention, tenderness


Genitourinary (Female): Positive: normal external genitalia, normal perenium


Vulva: both: normal


Vagina: Positive: normal moisture.  Negative: discharge


Cervix: Negative: lesion, discharge


Uterus: Positive: normal size, normal contour


Adnexa: both: normal


Anus/Rectum: Positive: normal perianal skin, heme negative.  Negative: rectal 

mass, hemorrhoids


Extremities: Positive: normal


Deep Tendon Reflex Grade: Normal +2





- Obstetrical


FHR: category 1


Uterine Contraction Monitor Mode: External


Cervical Dilatation: 2 (not ruptured DORIS 10)


Cervical Effacement Percentage: 50


Fetal station: -3


Uterine Contraction Pattern: Irregular


Uterine Tone Measurement Phase: Resting


Uterine Contraction Intensity: Mild





Results


Result Diagrams: 


 10/07/18 07:30





 10/07/18 07:30


All other labs normal.


GBS Negative 


HBsAg Screen              Negative                    Negative         *1


  RPR                       Non Reactive                Non Reactive     *2


 Rubella Antibodies, IgG


                            4.61 index                  Immune >0.99     *3


                                    Non-immune       <0.90


                                Equivocal  0.90 - 0.99


                                Immune           >0.99


   


  ABO Grouping              A                                            *4


  Rh Factor                 Positive                                     *5


    Please note: Prior records for this patient's ABO / Rh type are not


available for additional verification.


   


  Antibody Screen           Negative                    Negative         *6


  WBC                       8.7 x10E3/uL                3.4-10.8         *7


  RBC                       4.23 x10E6/uL               3.77-5.28        *8


  Hemoglobin           [L]  11.0 g/dL                   11.1-15.9        *9


  Hematocrit                35.4 %                      34.0-46.6        *10


  MCV                       84 fL                       79-97            *11


  MCH                  [L]  26.0 pg                     26.6-33.0        *12


  MCHC                 [L]  31.1 g/dL                   31.5-35.7        *13


  RDW                  [H]  16.1 %                      12.3-15.4        *14


  Platelets            [H]  515 x10E3/uL                150-379          *15


  Neutrophils               61 %                        Not Estab.       *16


  Lymphs                    28 %                        Not Estab.       *17


  Monocytes                 7 %                         Not Estab.       *18


  Eos                       4 %                         Not Estab.       *19


  Basos                     0 %                         Not Estab.       *20


! Immature Cells            <No Reported Value>                          *21


 Neutrophils (Absolute)


                            5.3 x10E3/uL                1.4-7.0          *22


  Lymphs (Absolute)         2.5 x10E3/uL                0.7-3.1          *23


  Monocytes(Absolute)       0.6 x10E3/uL                0.1-0.9          *24


  Eos (Absolute)            0.4 x10E3/uL                0.0-0.4          *25


  Baso (Absolute)           0.0 x10E3/uL                0.0-0.2          *26


! Immature Granulocytes


                            0 %                         Not Estab.       *27


! Immature Grans (Abs)      0.0 x10E3/uL                0.0-0.1          *28


! NRBC                      <No Reported Value>                          *29


  Hematology Comments:      <No Reported Value>                          *30





Tests: (2) Panel 720850 ([id number])


 HIV Screen 4th Generation wRfx


                            Non Reactive                Non Reactive     *31





Tests: (3) HCV Ab w/Rflx to Verification ([id number])


! HCV Ab                    <0.1 s/co ratio             0.0-0.9          *32





Tests: (4) Comment: ([id number])


! Comment:                  SPR[facility]                                        *33


    Non reactive HCV antibody screen is consistent with no HCV infection,


unless recent infection is suspected or other evidence exists to


indicate HCV infection.


   





Tests: (5) Urine Culture, Routine ([id number])


 Urine Culture, Routine


                            Final report                                 *34





Tests: (6) Result ([id number])


! Result 1                  "Result Below..."                            *35


      RESULT: Lactobacillus species


    50,000-100,000 colony forming units per mL





Assessment and Plan


17yo  @ 37 weeks chronic Htn not on meds Presents in early labor and 

severely elevated BP. US DORIS 10 BPP 6/8 Decision to admit- [facility] w/u, MGSO4, 

augment labor.  consulted. GBS negative Orders in EMR








- Patient Problems


(1) Pre-eclampsia superimposed on chronic hypertension


Current Visit: Yes   Status: Acute   





(2) 37 weeks gestation of pregnancy


Current Visit: Yes   Status: Acute   





(3) Non-reassuring fetal status


Current Visit: Yes   Status: Acute   


Plan to address problem: 


BPP 6/8 7